# Patient Record
Sex: FEMALE | Race: WHITE | Employment: FULL TIME | URBAN - METROPOLITAN AREA
[De-identification: names, ages, dates, MRNs, and addresses within clinical notes are randomized per-mention and may not be internally consistent; named-entity substitution may affect disease eponyms.]

---

## 2017-08-14 RX ORDER — DROSPIRENONE AND ETHINYL ESTRADIOL TABLETS 0.02-3(28)
KIT ORAL
Qty: 84 TAB | Refills: 4 | Status: SHIPPED | OUTPATIENT
Start: 2017-08-14 | End: 2017-08-29

## 2017-08-29 ENCOUNTER — OFFICE VISIT (OUTPATIENT)
Dept: OBGYN CLINIC | Age: 42
End: 2017-08-29

## 2017-08-29 VITALS
DIASTOLIC BLOOD PRESSURE: 76 MMHG | WEIGHT: 158 LBS | BODY MASS INDEX: 26.98 KG/M2 | HEIGHT: 64 IN | SYSTOLIC BLOOD PRESSURE: 132 MMHG

## 2017-08-29 DIAGNOSIS — Z01.419 ENCOUNTER FOR GYNECOLOGICAL EXAMINATION WITHOUT ABNORMAL FINDING: Primary | ICD-10-CM

## 2017-08-29 NOTE — MR AVS SNAPSHOT
Visit Information Date & Time Provider Department Dept. Phone Encounter #  
 8/29/2017  1:20 PM Carson Lagunas MD [facility] [phone] [id number] Upcoming Health Maintenance Date Due INFLUENZA AGE 9 TO ADULT 8/1/2017 PAP AKA CERVICAL CYTOLOGY 4/24/2018 Allergies as of 8/29/2017  Review Complete On: 8/29/2017 By: Esther Holcomb No Known Allergies Current Immunizations  Never Reviewed Name Date Tdap 11/6/2013  6:28 PM  
  
 Not reviewed this visit Vitals BP Height(growth percentile) Weight(growth percentile) Breastfeeding? BMI OB Status 132/76 5' 4\" (1.626 m) 158 lb (71.7 kg) No 27.12 kg/m2 Having regular periods Smoking Status Never Smoker BMI and BSA Data Body Mass Index Body Surface Area  
 27.12 kg/m 2 1.8 m 2 Preferred Pharmacy Pharmacy Name Phone [id number], [address] [place] [address] Your Updated Medication List  
  
   
This list is accurate as of: 8/29/17  1:28 PM.  Always use your most recent med list.  
  
  
  
  
 Lavon Yan (26) 3-0.02 mg Tab Generic drug:  drospirenone-ethinyl estradiol TAKE 1 TAB BY MOUTH DAILY. oxyCODONE-acetaminophen 5-325 mg per tablet Commonly known as:  PERCOCET Take 1 Tab by mouth every four (4) hours as needed. Introducing [facility]! Dear Ravin Doll: 
Thank you for requesting a [facility] account. Our records indicate that you already have an active [facility] account. You can access your account anytime at [email or web address] Did you know that you can access your hospital and ER discharge instructions at any time in [facility]? You can also review all of your test results from your hospital stay or ER visit. Additional Information If you have questions, please visit the Frequently Asked Questions section of the [facility] website at [email or web address] Remember, MyChart is NOT to be used for urgent needs. For medical emergencies, dial 911. Now available from your iPhone and Android! Please provide this summary of care documentation to your next provider. Your primary care clinician is listed as Jenn Arevalo Ii. If you have any questions after today's visit, please call [phone].

## 2017-08-29 NOTE — PROGRESS NOTES
Annual exam ages 40-58    Tiffany Mendez is a ,  35 y.o. female [facility] No LMP recorded. .    She presents for her annual checkup. She is having no significant problems. She has headaches with her menses. With regard to the Gardasil vaccine, she is older than the age for which it is FDA approved. Menstrual status:    Her periods are moderate in flow. She is using three to five pads or tampons per day, usually regular and last 26-30 days. She denies dysmenorrhea. She reports no premenstrual symptoms. Contraception:    The current method of family planning is OCP (estrogen/progesterone). Sexual history:    She  reports that she currently engages in sexual activity and has had male partners. She reports using the following method of birth control/protection: Pill. Medical conditions:    Since her last annual GYN exam about one year ago, she has not the following changes in her health history: none. Pap and Mammogram History:    Her most recent Pap smear was normal, obtained 2 year(s) ago. The patient had her mammogram today in our office. Breast Cancer History/Substance Abuse: negative    Osteoporosis History:    Family history does not include a first or second degree relative with osteopenia or osteoporosis. Past Medical History:   Diagnosis Date    Abnormal Pap smear     BRCA negative     Endometriosis     Fibroids     HSV-2 (herpes simplex virus 2) infection     Pap smear for cervical cancer screening 8/17/10 neg HPV NEG 12 neg 4/24/15 neg HPV NEG     Past Surgical History:   Procedure Laterality Date    HX ADENOIDECTOMY      HX DILATION AND CURETTAGE      HX MYOMECTOMY      HX PELVIC LAPAROSCOPY      HX TONSILLECTOMY         Current Outpatient Prescriptions   Medication Sig Dispense Refill    LORYNA, 26, 3-0.02 mg tab TAKE 1 TAB BY MOUTH DAILY.  84 Tab 4    oxyCODONE-acetaminophen (PERCOCET) 5-325 mg per tablet Take 1 Tab by mouth every four (4) hours as needed. 30 Tab 0     Allergies: Review of patient's allergies indicates no known allergies. Tobacco History:  reports that she has never smoked. She has never used smokeless tobacco.  Alcohol Abuse:  reports that she does not drink alcohol. Drug Abuse:  reports that she does not use illicit drugs.     Family Medical/Cancer History:   Family History   Problem Relation Age of Onset    Breast Cancer Mother         Review of Systems - History obtained from the patient  Constitutional: negative for weight loss, fever, night sweats  HEENT: negative for hearing loss, earache, congestion, snoring, sorethroat  CV: negative for chest pain, palpitations, edema  Resp: negative for cough, shortness of breath, wheezing  GI: negative for change in bowel habits, abdominal pain, black or bloody stools  : negative for frequency, dysuria, hematuria, vaginal discharge  MSK: negative for back pain, joint pain, muscle pain  Breast: negative for breast lumps, nipple discharge, galactorrhea  Skin :negative for itching, rash, hives  Neuro: negative for dizziness, headache, confusion, weakness  Psych: negative for anxiety, depression, change in mood  Heme/lymph: negative for bleeding, bruising, pallor    Physical Exam    Visit Vitals    /76    Ht 5' 4\" (1.626 m)    Wt 158 lb (71.7 kg)    Breastfeeding No    BMI 27.12 kg/m2       Constitutional  · Appearance: well-nourished, well developed, alert, in no acute distress    HENT  · Head and Face: appears normal    Neck  · Inspection/Palpation: normal appearance, no masses or tenderness  · Lymph Nodes: no lymphadenopathy present  · Thyroid: gland size normal, nontender, no nodules or masses present on palpation    Chest  · Respiratory Effort: breathing unlabored    Breasts  · Inspection of Breasts: breasts symmetrical, no skin changes, no discharge present, nipple appearance normal, no skin retraction present  · Palpation of Breasts and Axillae: no masses present on palpation, no breast tenderness  · Axillary Lymph Nodes: no lymphadenopathy present    Gastrointestinal  · Abdominal Examination: abdomen non-tender to palpation, normal bowel sounds, no masses present  · Liver and spleen: no hepatomegaly present, spleen not palpable  · Hernias: no hernias identified    Genitourinary  · External Genitalia: normal appearance for age, no discharge present, no tenderness present, no inflammatory lesions present, no masses present, no atrophy present  · Vagina: normal vaginal vault without central or paravaginal defects, no discharge present, no inflammatory lesions present, no masses present  · Bladder: non-tender to palpation  · Urethra: appears normal  · Cervix: normal   · Uterus: normal size, shape and consistency  · Adnexa: no adnexal tenderness present, no adnexal masses present  · Perineum: perineum within normal limits, no evidence of trauma, no rashes or skin lesions present  · Anus: anus within normal limits, no hemorrhoids present  · Inguinal Lymph Nodes: no lymphadenopathy present    Skin  · General Inspection: no rash, no lesions identified    Neurologic/Psychiatric  · Mental Status:  · Orientation: grossly oriented to person, place and time  · Mood and Affect: mood normal, affect appropriate    Assessment:  Routine gynecologic examination  Her current medical status is satisfactory with no evidence of significant gynecologic issues. Menstrual migraines and hypoactive desire disorder on current ocp will switch to loloestrin.     Plan:  Counseled re: diet, exercise, healthy lifestyle  Return for yearly wellness visits  Rec annual mammogram

## 2017-11-27 ENCOUNTER — TELEPHONE (OUTPATIENT)
Dept: OBGYN CLINIC | Age: 42
End: 2017-11-27

## 2017-11-27 RX ORDER — DROSPIRENONE AND ETHINYL ESTRADIOL 0.03MG-3MG
1 KIT ORAL DAILY
Qty: 3 PACKAGE | Refills: 2 | Status: SHIPPED | OUTPATIENT
Start: 2017-11-27 | End: 2019-01-11

## 2017-11-27 NOTE — TELEPHONE ENCOUNTER
Call received at 1:35PM      38year old patient last seen in the office on 8/29/17. Patient calling to say that she likes the Lo Loestrin Fe but has changed insurance and it is to costly for her. Patient would like something else similar to Lulú. Please advised regarding prescription details.

## 2017-11-27 NOTE — TELEPHONE ENCOUNTER
Patient advised of MD recommendations and prescription sent as per MD order to patient preferred pharmacy till AE 8/2018. Patient verbalized understanding.

## 2017-11-29 RX ORDER — DROSPIRENONE AND ETHINYL ESTRADIOL 0.03MG-3MG
1 KIT ORAL DAILY
Qty: 3 PACKAGE | Refills: 2 | Status: SHIPPED | OUTPATIENT
Start: 2017-11-29 | End: 2019-01-11

## 2017-11-29 NOTE — TELEPHONE ENCOUNTER
Call received at 12:09PM    FW Patient calling back to say that the prescription sent on Monday for the generic Josefina Jodie is very expensive and the pharmacy as advised that a prescription sent for the brand name Lulú would only be a $10.00 co pay      ?  Ok to sent prescription for brand name only dispense as written    Please advise

## 2018-11-14 ENCOUNTER — TELEPHONE (OUTPATIENT)
Dept: OBGYN CLINIC | Age: 43
End: 2018-11-14

## 2018-11-14 NOTE — TELEPHONE ENCOUNTER
Call received at 152PM    37year old patient last seen in the office on 8/29/18. Patient has just scheduled her next appointment is for 12/27/18 for mammogram and annual.      Patient asking about getting a refill on her ocp  to get her to her scheduled appointment. Prescription sent for ocp as per MD order to patient preferred pharmacy to get her to her scheduled appointment. Patient advised of need to keep appointment in order to get further refills to get her to her appointment. Patient verbalized understanding.

## 2018-12-27 ENCOUNTER — APPOINTMENT (OUTPATIENT)
Dept: OBGYN CLINIC | Age: 43
End: 2018-12-27

## 2019-01-05 RX ORDER — NORETHINDRONE ACETATE AND ETHINYL ESTRADIOL, ETHINYL ESTRADIOL AND FERROUS FUMARATE 1MG-10(24)
KIT ORAL
Qty: 3 PACKAGE | Refills: 3 | Status: SHIPPED | OUTPATIENT
Start: 2019-01-05 | End: 2019-01-11 | Stop reason: SDUPTHER

## 2019-01-11 ENCOUNTER — OFFICE VISIT (OUTPATIENT)
Dept: OBGYN CLINIC | Age: 44
End: 2019-01-11

## 2019-01-11 VITALS
DIASTOLIC BLOOD PRESSURE: 74 MMHG | SYSTOLIC BLOOD PRESSURE: 118 MMHG | WEIGHT: 168 LBS | HEIGHT: 64 IN | BODY MASS INDEX: 28.68 KG/M2

## 2019-01-11 DIAGNOSIS — Z01.419 ENCOUNTER FOR GYNECOLOGICAL EXAMINATION WITHOUT ABNORMAL FINDING: Primary | ICD-10-CM

## 2019-01-11 RX ORDER — SUMATRIPTAN 25 MG/1
25 TABLET, FILM COATED ORAL
COMMUNITY
End: 2020-02-13

## 2019-01-11 NOTE — PROGRESS NOTES
Annual exam ages 40-58    Patric Mohr is a ,  37 y.o. female [facility] No LMP recorded. .    She presents for her annual checkup. She is having significant migraines and hot flashes. With regard to the Gardasil vaccine, she is older than the age for which it is FDA approved. Menstrual status:    Her periods are moderate in flow. She is using three to five pads or tampons per day, usually regular and last 26-30 days. She denies dysmenorrhea. She reports no premenstrual symptoms. Contraception:    The current method of family planning is OCP (estrogen/progesterone). Sexual history:    She  reports that she currently engages in sexual activity and has had partners who are Male. She reports using the following method of birth control/protection: Pill. Medical conditions:    Since her last annual GYN exam about one year ago, she has not the following changes in her health history: none. Pap and Mammogram History:    Her most recent Pap smear was normal, obtained 4 year(s) ago. The patient had a recent mammogram 2018 which was negative for malignancy. Breast Cancer History/Substance Abuse: maternal family hx    Osteoporosis History:    Family history does not include a first or second degree relative with osteopenia or osteoporosis. Past Medical History:   Diagnosis Date    Abnormal Pap smear     BRCA negative     Endometriosis     Fibroids     HSV-2 (herpes simplex virus 2) infection     Pap smear for cervical cancer screening 8/17/10 neg HPV NEG 12 neg 4/24/15 neg HPV NEG     Past Surgical History:   Procedure Laterality Date    HX ADENOIDECTOMY      HX DILATION AND CURETTAGE      HX MYOMECTOMY      HX PELVIC LAPAROSCOPY      HX TONSILLECTOMY         Current Outpatient Medications   Medication Sig Dispense Refill    SUMAtriptan (IMITREX) 25 mg tablet Take 25 mg by mouth once as needed for Migraine.       LO LOESTRIN FE 1 mg-10 mcg (24)/10 mcg (2) tab TAKE 1 TABLET BY MOUTH EVERY DAY 3 Package 3    drospirenone-ethinyl estradiol (AZ) 3-0.03 mg tab Take 1 Tab by mouth daily. 3 Package 2    drospirenone-ethinyl estradiol (OCELLA) 3-0.03 mg tab Take 1 Tab by mouth daily. 3 Package 2    oxyCODONE-acetaminophen (PERCOCET) 5-325 mg per tablet Take 1 Tab by mouth every four (4) hours as needed. 30 Tab 0     Allergies: Patient has no known allergies. Tobacco History:  reports that  has never smoked. she has never used smokeless tobacco.  Alcohol Abuse:  reports that she does not drink alcohol. Drug Abuse:  reports that she does not use drugs.     Family Medical/Cancer History:   Family History   Problem Relation Age of Onset    Breast Cancer Mother         Review of Systems - History obtained from the patient  Constitutional: negative for weight loss, fever, night sweats  HEENT: negative for hearing loss, earache, congestion, snoring, sorethroat  CV: negative for chest pain, palpitations, edema  Resp: negative for cough, shortness of breath, wheezing  GI: negative for change in bowel habits, abdominal pain, black or bloody stools  : negative for frequency, dysuria, hematuria, vaginal discharge  MSK: negative for back pain, joint pain, muscle pain  Breast: negative for breast lumps, nipple discharge, galactorrhea  Skin :negative for itching, rash, hives  Neuro: negative for dizziness, headache, confusion, weakness  Psych: negative for anxiety, depression, change in mood  Heme/lymph: negative for bleeding, bruising, pallor    Physical Exam    Visit Vitals  /74   Ht 5' 4\" (1.626 m)   Wt 168 lb (76.2 kg)   BMI 28.84 kg/m²       Constitutional  · Appearance: well-nourished, well developed, alert, in no acute distress    HENT  · Head and Face: appears normal    Neck  · Inspection/Palpation: normal appearance, no masses or tenderness  · Lymph Nodes: no lymphadenopathy present  · Thyroid: gland size normal, nontender, no nodules or masses present on palpation    Chest  · Respiratory Effort: breathing unlabored    Breasts  · Inspection of Breasts: breasts symmetrical, no skin changes, no discharge present, nipple appearance normal, no skin retraction present  · Palpation of Breasts and Axillae: no masses present on palpation, no breast tenderness  · Axillary Lymph Nodes: no lymphadenopathy present    Gastrointestinal  · Abdominal Examination: abdomen non-tender to palpation, normal bowel sounds, no masses present  · Liver and spleen: no hepatomegaly present, spleen not palpable  · Hernias: no hernias identified    Genitourinary  · External Genitalia: normal appearance for age, no discharge present, no tenderness present, no inflammatory lesions present, no masses present, no atrophy present  · Vagina: normal vaginal vault without central or paravaginal defects, no discharge present, no inflammatory lesions present, no masses present  · Bladder: non-tender to palpation  · Urethra: appears normal  · Cervix: normal   · Uterus: normal size, shape and consistency  · Adnexa: no adnexal tenderness present, no adnexal masses present  · Perineum: perineum within normal limits, no evidence of trauma, no rashes or skin lesions present  · Anus: anus within normal limits, no hemorrhoids present  · Inguinal Lymph Nodes: no lymphadenopathy present    Skin  · General Inspection: no rash, no lesions identified    Neurologic/Psychiatric  · Mental Status:  · Orientation: grossly oriented to person, place and time  · Mood and Affect: mood normal, affect appropriate    Assessment:  Routine gynecologic examination  Her current medical status is satisfactory with no evidence of significant gynecologic issues.   Menstrual migraines-  Will start continuous ocps      Plan:  Counseled re: diet, exercise, healthy lifestyle  Return for yearly wellness visits  Rec annual mammogram

## 2019-03-15 ENCOUNTER — TELEPHONE (OUTPATIENT)
Dept: OBGYN CLINIC | Age: 44
End: 2019-03-15

## 2019-03-15 NOTE — TELEPHONE ENCOUNTER
Patient states that she has been having heavy AUB on Lo Loestrin (she is taking the pills Active pills only). She feels that she has been on them well over 3 months and is still very abnormal in her bleeding pattern. She feels sometimes she has bleeding that is spotting and then other times she has to run to insert a tampon. Not soaking through a tampon but dirties it. Lower back pain  Has a history of endometriosis and fibroids in uterine wall. Patient states that she is mostly using this medication to help control migraines the week of her cycle, which has done well. Consult with FW set up to come in to talk about next option. She will try Ibuprofen 600-800 mg q8hrs x 72 hours to help with inflammation and bleeding (take with food). Call prn.

## 2019-03-25 ENCOUNTER — OFFICE VISIT (OUTPATIENT)
Dept: OBGYN CLINIC | Age: 44
End: 2019-03-25

## 2019-03-25 DIAGNOSIS — N93.9 ABNORMAL UTERINE BLEEDING (AUB): Primary | ICD-10-CM

## 2019-03-25 LAB
HCG URINE, QL. (POC): NEGATIVE
VALID INTERNAL CONTROL?: YES

## 2019-03-25 NOTE — PROGRESS NOTES
Abnormal bleeding note      Alberto Darnellman is a 37 y.o. female who complains of vaginal bleeding problems. Her current method of family planning is OCP (estrogen/progesterone). She developed this problem approximately a few weeks ago. She has had vaginal bleeding which she describes as moderate, spotting, irregular, with cramping lasting up to a few weeks. She switched to continuous OCP's recently. Associated symptoms include cramping. Alleviating factors: none    Aggravating factors: none      The patient is sexually active. Her relevant past medical history:   Past Medical History:   Diagnosis Date    Abnormal Pap smear 2001    BRCA negative     Endometriosis     Fibroids     HSV-2 (herpes simplex virus 2) infection     Pap smear for cervical cancer screening 8/17/10 neg HPV NEG 9/24/12 neg 4/24/15 neg HPV NEG        Past Surgical History:   Procedure Laterality Date    HX ADENOIDECTOMY      HX DILATION AND CURETTAGE      HX MYOMECTOMY      HX PELVIC LAPAROSCOPY      HX TONSILLECTOMY       Social History     Occupational History    Not on file   Tobacco Use    Smoking status: Never Smoker    Smokeless tobacco: Never Used   Substance and Sexual Activity    Alcohol use: No    Drug use: No    Sexual activity: Yes     Partners: Male     Birth control/protection: Pill     Family History   Problem Relation Age of Onset    Breast Cancer Mother        No Known Allergies  Prior to Admission medications    Medication Sig Start Date End Date Taking? Authorizing Provider   SUMAtriptan (IMITREX) 25 mg tablet Take 25 mg by mouth once as needed for Migraine. Yes Provider, Historical   norethindrone-e.estradiol-iron (LO LOESTRIN FE) 1 mg-10 mcg (24)/10 mcg (2) tab TAKE 1 TABLET BY MOUTH EVERY DAY, SKIP PLACEBOS TO ACHIEVE AMENORRHEA 1/11/19  Yes Tamika Alvarez MD   oxyCODONE-acetaminophen (PERCOCET) 5-325 mg per tablet Take 1 Tab by mouth every four (4) hours as needed.  11/6/13 Kia Iqbal MD        Review of Systems - History obtained from the patient  Constitutional: negative for weight loss, fever, night sweats  HEENT: negative for hearing loss, earache, congestion, snoring, sorethroat  CV: negative for chest pain, palpitations, edema  Resp: negative for cough, shortness of breath, wheezing  Breast: negative for breast lumps, nipple discharge, galactorrhea  GI: negative for change in bowel habits, abdominal pain, black or bloody stools  : negative for frequency, dysuria, hematuria  MSK: negative for back pain, joint pain, muscle pain  Skin: negative for itching, rash, hives  Neuro: negative for dizziness, headache, confusion, weakness  Psych: negative for anxiety, depression, change in mood  Heme/lymph: negative for bleeding, bruising, pallor      Objective: There were no vitals taken for this visit.        PHYSICAL EXAMINATION    Constitutional  · Appearance: well-nourished, well developed, alert, in no acute distress    HENT  · Head and Face: appears normal    Neck  · Inspection/Palpation: normal appearance, no masses or tenderness  · Lymph Nodes: no lymphadenopathy present  · Thyroid: gland size normal, nontender, no nodules or masses present on palpation  ·   Breasts  · Inspection of Breasts: breasts symmetrical, no skin changes, no discharge present, nipple appearance normal, no skin retraction present  · Palpation of Breasts and Axillae: no masses present on palpation, no breast tenderness  · Axillary Lymph Nodes: no lymphadenopathy present    Gastrointestinal  · Abdominal Examination: abdomen non-tender to palpation, normal bowel sounds, no masses present  · Liver and spleen: no hepatomegaly present, spleen not palpable  · Hernias: no hernias identified    Genitourinary  · External Genitalia: normal appearance for age, no discharge present, no tenderness present, no inflammatory lesions present, no masses present, no atrophy present  · Vagina: normal vaginal vault without central or paravaginal defects, no discharge present, no inflammatory lesions present, no masses present  · Bladder: non-tender to palpation  · Urethra: appears normal  · Cervix: normal   · Uterus: normal size, shape and consistency  · Adnexa: no adnexal tenderness present, no adnexal masses present  · Perineum: perineum within normal limits, no evidence of trauma, no rashes or skin lesions present  · Anus: anus within normal limits, no hemorrhoids present  · Inguinal Lymph Nodes: no lymphadenopathy present    Skin  · General Inspection: no rash, no lesions identified    Neurologic/Psychiatric  · Mental Status:  · Orientation: grossly oriented to person, place and time  · Mood and Affect: mood normal, affect appropriate    Assessment/Plan:   AUB- likely from continuous ocps. Will go back to taking normal x 1-2 packs then can restart continuous (for menstrual migraine prevention). If no improvement consider switching to different ocp vs ablation + ocps for menstrual migraines. Instructions given to pt. Handouts given to pt.

## 2019-03-27 LAB
FT4I SERPL CALC-MCNC: 1.8 (ref 1.2–4.9)
T3RU NFR SERPL: 21 % (ref 24–39)
T4 SERPL-MCNC: 8.8 UG/DL (ref 4.5–12)
TSH SERPL DL<=0.005 MIU/L-ACNC: 2.92 UIU/ML (ref 0.45–4.5)

## 2020-02-13 ENCOUNTER — OFFICE VISIT (OUTPATIENT)
Dept: OBGYN CLINIC | Age: 45
End: 2020-02-13

## 2020-02-13 VITALS
BODY MASS INDEX: 29.88 KG/M2 | SYSTOLIC BLOOD PRESSURE: 112 MMHG | HEIGHT: 64 IN | WEIGHT: 175 LBS | DIASTOLIC BLOOD PRESSURE: 76 MMHG

## 2020-02-13 DIAGNOSIS — Z01.419 ENCOUNTER FOR GYNECOLOGICAL EXAMINATION WITHOUT ABNORMAL FINDING: Primary | ICD-10-CM

## 2020-02-13 NOTE — PROGRESS NOTES
Annual exam ages 40-58      Joe Holden is a ,  53 y.o. female   No LMP recorded. She presents for her annual checkup. She is having no significant problems. Menstrual status:    Her periods are light in flow. She is using one to three pads or tampons per day, usually regular with a 26-32 day interval with 3-7 day duration. She does not have dysmenorrhea. She reports no premenstrual symptoms. Contraception:    The current method of family planning is OCP. Hormonal status:  She reports no perimenstrual type symptoms. She is not having vasomotor symptoms. The patient is not using any ERT. Sexual history:    She  reports being sexually active and has had partner(s) who are Male. She reports using the following method of birth control/protection: Pill. Medical conditions:    Since her last annual GYN exam about one year ago, she has not the following changes in her health history: none. Surgical history confirmed with patient. has a past surgical history that includes hx adenoidectomy; hx dilation and curettage; hx pelvic laparoscopy; hx myomectomy; and hx tonsillectomy. Pap and Mammogram History:    Her most recent Pap smear was normal, obtained 5 year(s) ago. The patient had her mammogram today in our office. Breast Cancer History/Substance Abuse: +mother      Osteoporosis History:    Family history does not include a first or second degree relative with osteopenia or osteoporosis.       Past Medical History:   Diagnosis Date    Abnormal Pap smear     BRCA negative     Endometriosis     Fibroids     HSV-2 (herpes simplex virus 2) infection     Pap smear for cervical cancer screening 8/17/10 neg HPV NEG 12 neg 4/24/15 neg HPV NEG    SV[facility]supra[facility]ricular [facility] ([facility])      Past Surgical History:   Procedure Laterality Date    HX ADENOIDECTOMY      HX DILATION AND CURETTAGE      HX MYOMECTOMY      HX PELVIC LAPAROSCOPY      HX TONSILLECTOMY         Current Outpatient Medications   Medication Sig Dispense Refill    METOPROLOL SUCCINATE PO Take  by mouth.  norethindrone-e.estradiol-iron (LO LOESTRIN FE) 1 mg-10 mcg (24)/10 mcg (2) tab TAKE 1 TABLET BY MOUTH EVERY DAY, SKIP PLACEBOS TO ACHIEVE AMENORRHEA 3 Package 3    SUMAtriptan (IMITREX) 25 mg tablet Take 25 mg by mouth once as needed for Migraine.  oxyCODONE-acetaminophen (PERCOCET) 5-325 mg per tablet Take 1 Tab by mouth every four (4) hours as needed. 30 Tab 0     Allergies: Patient has no known allergies. Tobacco History:  reports that she has never smoked. She has never used smokeless tobacco.  Alcohol Abuse:  reports no history of alcohol use. Drug Abuse:  reports no history of drug use.     Family Medical/Cancer History:   Family History   Problem Relation Age of Onset    Breast Cancer Mother         Review of Systems - History obtained from the patient  Constitutional: negative for weight loss, fever, night sweats  HEENT: negative for hearing loss, earache, congestion, snoring, sorethroat  CV: negative for chest pain, palpitations, edema  Resp: negative for cough, shortness of breath, wheezing  GI: negative for change in bowel habits, abdominal pain, black or bloody stools  : negative for frequency, dysuria, hematuria, vaginal discharge  MSK: negative for back pain, joint pain, muscle pain  Breast: negative for breast lumps, nipple discharge, galactorrhea  Skin :negative for itching, rash, hives  Neuro: negative for dizziness, headache, confusion, weakness  Psych: negative for anxiety, depression, change in mood  Heme/lymph: negative for bleeding, bruising, pallor    Physical Exam    Visit Vitals  /76   Ht 5' 4\" (1.626 m)   Wt 175 lb (79.4 kg)   BMI 30.04 kg/m²       Constitutional  · Appearance: well-nourished, well developed, alert, in no acute distress    HENT  · Head and Face: appears normal    Neck  · Inspection/Palpation: normal appearance, no masses or tenderness  · Lymph Nodes: no lymphadenopathy present  · Thyroid: gland size normal, nontender, no nodules or masses present on palpation    Chest  · Respiratory Effort: breathing unlabored    Breasts  · Inspection of Breasts: breasts symmetrical, no skin changes, no discharge present, nipple appearance normal, no skin retraction present  · Palpation of Breasts and Axillae: no masses present on palpation, no breast tenderness  · Axillary Lymph Nodes: no lymphadenopathy present    Gastrointestinal  · Abdominal Examination: abdomen non-tender to palpation, normal bowel sounds, no masses present  · Liver and spleen: no hepatomegaly present, spleen not palpable  · Hernias: no hernias identified    Genitourinary  · External Genitalia: normal appearance for age, no discharge present, no tenderness present, no inflammatory lesions present, no masses present, no atrophy present  · Vagina: normal vaginal vault without central or paravaginal defects, no discharge present, no inflammatory lesions present, no masses present  · Bladder: non-tender to palpation  · Urethra: appears normal  · Cervix: normal   · Uterus: normal size, shape and consistency  · Adnexa: no adnexal tenderness present, no adnexal masses present  · Perineum: perineum within normal limits, no evidence of trauma, no rashes or skin lesions present  · Anus: anus within normal limits, no hemorrhoids present  · Inguinal Lymph Nodes: no lymphadenopathy present    Skin  · General Inspection: no rash, no lesions identified    Neurologic/Psychiatric  · Mental Status:  · Orientation: grossly oriented to person, place and time  · Mood and Affect: mood normal, affect appropriate    Assessment:  Routine gynecologic examination  Her current medical status is satisfactory with no evidence of significant gynecologic issues.   Continue ocps- pt denied migraines with [facility]    Plan:  Counseled re: diet, exercise, healthy lifestyle  Return for yearly wellness visits  Rec annual mammogram

## 2020-02-18 LAB
CYTOLOGIST CVX/VAG CYTO: NORMAL
CYTOLOGY CVX/VAG DOC CYTO: NORMAL
CYTOLOGY CVX/VAG DOC THIN PREP: NORMAL
CYTOLOGY HISTORY:: NORMAL
DX ICD CODE: NORMAL
HPV I/H RISK 1 DNA CVX QL PROBE+SIG AMP: NEGATIVE
Lab: NORMAL
OTHER STN SPEC: NORMAL
STAT OF ADQ CVX/VAG CYTO-IMP: NORMAL

## 2021-01-08 RX ORDER — NORETHINDRONE ACETATE AND ETHINYL ESTRADIOL, ETHINYL ESTRADIOL AND FERROUS FUMARATE 1MG-10(24)
KIT ORAL
Refills: 3 | OUTPATIENT
Start: 2021-01-08

## 2021-01-08 NOTE — TELEPHONE ENCOUNTER
Last AE 2/13/20    Next AE not scheduled, will decline refill of the Lo Loestrin by pharmacy request until set.

## 2021-03-01 ENCOUNTER — OFFICE VISIT (OUTPATIENT)
Dept: OBGYN CLINIC | Age: 46
End: 2021-03-01

## 2021-03-01 VITALS — DIASTOLIC BLOOD PRESSURE: 76 MMHG | SYSTOLIC BLOOD PRESSURE: 126 MMHG | WEIGHT: 166 LBS | BODY MASS INDEX: 28.49 KG/M2

## 2021-03-01 DIAGNOSIS — Z01.419 ENCOUNTER FOR GYNECOLOGICAL EXAMINATION WITHOUT ABNORMAL FINDING: Primary | ICD-10-CM

## 2021-03-01 PROCEDURE — 99396 PREV VISIT EST AGE 40-64: CPT | Performed by: OBSTETRICS & GYNECOLOGY

## 2021-03-01 RX ORDER — NORETHINDRONE ACETATE AND ETHINYL ESTRADIOL, ETHINYL ESTRADIOL AND FERROUS FUMARATE 1MG-10(24)
KIT ORAL
Qty: 3 PACKAGE | Refills: 4 | Status: SHIPPED | OUTPATIENT
Start: 2021-03-01 | End: 2022-04-26 | Stop reason: SDUPTHER

## 2021-03-01 NOTE — PROGRESS NOTES
Annual exam ages 40-58      Yasmany Meza is a ,  50 y.o. female   No LMP recorded. (Menstrual status: Chemically Induced). She presents for her annual checkup. She is having no significant problems. Menstrual status:    Her periods are absent due to OCP's. Contraception:    The current method of family planning is OCP. Hormonal status:  She reports no perimenstrual type symptoms. She is not having vasomotor symptoms. The patient is not using any ERT. Sexual history:    She  reports being sexually active and has had partner(s) who are Male. She reports using the following method of birth control/protection: Pill. Medical conditions:    Since her last annual GYN exam about one year ago, she has not the following changes in her health history: none. Surgical history confirmed with patient. has a past surgical history that includes hx adenoidectomy; hx dilation and curettage; hx pelvic laparoscopy; hx myomectomy; and hx tonsillectomy. Pap and Mammogram History:    Her most recent Pap smear was normal, obtained 1 year(s) ago. The patient had her mammogram today in our office. Breast Cancer History/Substance Abuse: +mother      Osteoporosis History:    Family history does not include a first or second degree relative with osteopenia or osteoporosis.     Past Medical History:   Diagnosis Date    Abnormal Pap smear     BRCA negative     Endometriosis     Fibroids     HSV-2 (herpes simplex virus 2) infection     Pap smear for cervical cancer screening 8/17/10 neg HPV NEG 12 neg 4/24/15 neg HPV NEG    SVT (supraventricular tachycardia) ([facility])      Past Surgical History:   Procedure Laterality Date    HX ADENOIDECTOMY      HX DILATION AND CURETTAGE      HX MYOMECTOMY      HX PELVIC LAPAROSCOPY      HX TONSILLECTOMY         Current Outpatient Medications   Medication Sig Dispense Refill    norethindrone-e.estradioL-iron (LO LOESTRIN FE) 1 mg-10 mcg (24)/10 mcg (2) tab TAKE 1 TABLET BY MOUTH EVERY DAY, SKIP PLACEBOS TO ACHIEVE AMENORRHEA 3 Package 4    METOPROLOL SUCCINATE PO Take  by mouth.  oxyCODONE-acetaminophen (PERCOCET) 5-325 mg per tablet Take 1 Tab by mouth every four (4) hours as needed. 30 Tab 0     Allergies: Patient has no known allergies. Tobacco History:  reports that she has never smoked. She has never used smokeless tobacco.  Alcohol Abuse:  reports no history of alcohol use. Drug Abuse:  reports no history of drug use.     Family Medical/Cancer History:   Family History   Problem Relation Age of Onset    Breast Cancer Mother         Review of Systems - History obtained from the patient  Constitutional: negative for weight loss, fever, night sweats  HEENT: negative for hearing loss, earache, congestion, snoring, sorethroat  CV: negative for chest pain, palpitations, edema  Resp: negative for cough, shortness of breath, wheezing  GI: negative for change in bowel habits, abdominal pain, black or bloody stools  : negative for frequency, dysuria, hematuria, vaginal discharge  MSK: negative for back pain, joint pain, muscle pain  Breast: negative for breast lumps, nipple discharge, galactorrhea  Skin :negative for itching, rash, hives  Neuro: negative for dizziness, headache, confusion, weakness  Psych: negative for anxiety, depression, change in mood  Heme/lymph: negative for bleeding, bruising, pallor    Physical Exam    Visit Vitals  /76   Wt 166 lb (75.3 kg)   BMI 28.49 kg/m²       Constitutional  · Appearance: well-nourished, well developed, alert, in no acute distress    HENT  · Head and Face: appears normal    Neck  · Inspection/Palpation: normal appearance, no masses or tenderness  · Lymph Nodes: no lymphadenopathy present  · Thyroid: gland size normal, nontender, no nodules or masses present on palpation    Chest  · Respiratory Effort: breathing unlabored    Breasts  · Inspection of Breasts: breasts symmetrical, no skin changes, no discharge present, nipple appearance normal, no skin retraction present  · Palpation of Breasts and Axillae: no masses present on palpation, no breast tenderness  · Axillary Lymph Nodes: no lymphadenopathy present    Gastrointestinal  · Abdominal Examination: abdomen non-tender to palpation, normal bowel sounds, no masses present  · Liver and spleen: no hepatomegaly present, spleen not palpable  · Hernias: no hernias identified    Genitourinary  · External Genitalia: normal appearance for age, no discharge present, no tenderness present, no inflammatory lesions present, no masses present, no atrophy present  · Vagina: normal vaginal vault without central or paravaginal defects, no discharge present, no inflammatory lesions present, no masses present  · Bladder: non-tender to palpation  · Urethra: appears normal  · Cervix: normal   · Uterus: normal size, shape and consistency  · Adnexa: no adnexal tenderness present, no adnexal masses present  · Perineum: perineum within normal limits, no evidence of trauma, no rashes or skin lesions present  · Anus: anus within normal limits, no hemorrhoids present  · Inguinal Lymph Nodes: no lymphadenopathy present    Skin  · General Inspection: no rash, no lesions identified    Neurologic/Psychiatric  · Mental Status:  · Orientation: grossly oriented to person, place and time  · Mood and Affect: mood normal, affect appropriate    Assessment:  Routine gynecologic examination  Her current medical status is satisfactory with no evidence of significant gynecologic issues.     Plan:  Counseled re: diet, exercise, healthy lifestyle  Return for yearly wellness visits  Rec annual mammogram

## 2022-04-22 NOTE — PROGRESS NOTES
Annual exam ages 40-58      Alyson Sullivan is a ,  48 y.o. female   No LMP recorded. (Menstrual status: Chemically Induced). She presents for her annual checkup. She is having no significant problems. Menstrual status:    Her periods have been occurring monthly the last several months. Contraception:    The current method of family planning is OCP. Hormonal status:  She reports no perimenstrual type symptoms. She is not having vasomotor symptoms. The patient is not using any ERT. Sexual history:    She  reports being sexually active and has had partner(s) who are male. She reports using the following method of birth control/protection: Pill. Medical conditions:    Since her last annual GYN exam about one year ago, she has not the following changes in her health history: none. Surgical history confirmed with patient. has a past surgical history that includes hx adenoidectomy; hx dilation and curettage; hx pelvic laparoscopy; hx myomectomy; and hx tonsillectomy. Pap and Mammogram History:    Her most recent Pap smear was normal, obtained 2 year(s) ago. The patient is scheduled for a mammogram on 22. .    Breast Cancer History/Substance Abuse: +mother. Pt is BRCA negative. Osteoporosis History:    Family history does not include a first or second degree relative with osteopenia or osteoporosis.     Past Medical History:   Diagnosis Date    Abnormal Pap smear     BRCA negative     Endometriosis     Fibroids     HSV-2 (herpes simplex virus 2) infection     Pap smear for cervical cancer screening 8/17/10 neg HPV NEG 12 neg 4/24/15 neg HPV NEG    SVT (supraventricular tachycardia) ([facility])      Past Surgical History:   Procedure Laterality Date    HX ADENOIDECTOMY      HX DILATION AND CURETTAGE      HX MYOMECTOMY      HX PELVIC LAPAROSCOPY      HX TONSILLECTOMY         Current Outpatient Medications   Medication Sig Dispense Refill    norethindrone-e.estradioL-iron (Lo Loestrin Fe) 1 mg-10 mcg (24)/10 mcg (2) tab TAKE 1 TABLET BY MOUTH EVERY DAY, SKIP PLACEBOS TO ACHIEVE AMENORRHEA 3 Package 4    METOPROLOL SUCCINATE PO Take  by mouth.  oxyCODONE-acetaminophen (PERCOCET) 5-325 mg per tablet Take 1 Tab by mouth every four (4) hours as needed. 30 Tab 0     Allergies: Patient has no known allergies. Tobacco History:  reports that she has never smoked. She has never used smokeless tobacco.  Alcohol Abuse:  reports no history of alcohol use. Drug Abuse:  reports no history of drug use. Family Medical/Cancer History:   Family History   Problem Relation Age of Onset    Breast Cancer Mother         Review of Systems - History obtained from the patient  Constitutional: negative for weight loss, fever, night sweats  HEENT: negative for hearing loss, earache, congestion, snoring, sorethroat  CV: negative for chest pain, palpitations, edema  Resp: negative for cough, shortness of breath, wheezing  GI: negative for change in bowel habits, abdominal pain, black or bloody stools  : negative for frequency, dysuria, hematuria, vaginal discharge  MSK: negative for back pain, joint pain, muscle pain  Breast: negative for breast lumps, nipple discharge, galactorrhea  Skin :negative for itching, rash, hives  Neuro: negative for dizziness, headache, confusion, weakness  Psych: negative for anxiety, depression, change in mood  Heme/lymph: negative for bleeding, bruising, pallor    Physical Exam    There were no vitals taken for this visit.     Constitutional  · Appearance: well-nourished, well developed, alert, in no acute distress    HENT  · Head and Face: appears normal    Neck  · Inspection/Palpation: normal appearance, no masses or tenderness  · Lymph Nodes: no lymphadenopathy present  · Thyroid: gland size normal, nontender, no nodules or masses present on palpation    Chest  · Respiratory Effort: breathing unlabored    Breasts  · Inspection of Breasts: breasts symmetrical, no skin changes, no discharge present, nipple appearance normal, no skin retraction present  · Palpation of Breasts and Axillae: no masses present on palpation, no breast tenderness  · Axillary Lymph Nodes: no lymphadenopathy present    Gastrointestinal  · Abdominal Examination: abdomen non-tender to palpation, normal bowel sounds, no masses present  · Liver and spleen: no hepatomegaly present, spleen not palpable  · Hernias: no hernias identified    Genitourinary  · External Genitalia: normal appearance for age, no discharge present, no tenderness present, no inflammatory lesions present, no masses present, no atrophy present  · Vagina: normal vaginal vault without central or paravaginal defects, no discharge present, no inflammatory lesions present, no masses present  · Bladder: non-tender to palpation  · Urethra: appears normal  · Cervix: normal   · Uterus: normal size, shape and consistency  · Adnexa: no adnexal tenderness present, no adnexal masses present  · Perineum: perineum within normal limits, no evidence of trauma, no rashes or skin lesions present  · Anus: anus within normal limits, no hemorrhoids present  · Inguinal Lymph Nodes: no lymphadenopathy present    Skin  · General Inspection: no rash, no lesions identified    Neurologic/Psychiatric  · Mental Status:  · Orientation: grossly oriented to person, place and time  · Mood and Affect: mood normal, affect appropriate    Assessment:  Routine gynecologic examination  Her current medical status is satisfactory with no evidence of significant gynecologic issues. Mother with Breast cancer at 52 and Maternal Grandmother with ovarian cancer. Pt believes she previously had BRCA gene testing by not results of testing could be found, rec [employer] testing.   AUB/cramping- rto for ultrasound    Plan:  Counseled re: diet, exercise, healthy lifestyle  Return for yearly wellness visits  Rec annual mammogram

## 2022-04-25 ENCOUNTER — PATIENT MESSAGE (OUTPATIENT)
Dept: OBGYN CLINIC | Age: 47
End: 2022-04-25

## 2022-04-25 ENCOUNTER — OFFICE VISIT (OUTPATIENT)
Dept: OBGYN CLINIC | Age: 47
End: 2022-04-25
Payer: COMMERCIAL

## 2022-04-25 VITALS
BODY MASS INDEX: 28.85 KG/M2 | RESPIRATION RATE: 80 BRPM | SYSTOLIC BLOOD PRESSURE: 132 MMHG | WEIGHT: 169 LBS | DIASTOLIC BLOOD PRESSURE: 72 MMHG | HEIGHT: 64 IN

## 2022-04-25 DIAGNOSIS — Z80.9 FAMILY HISTORY OF CANCER: Primary | ICD-10-CM

## 2022-04-25 PROCEDURE — 99396 PREV VISIT EST AGE 40-64: CPT | Performed by: OBSTETRICS & GYNECOLOGY

## 2022-04-26 RX ORDER — NORETHINDRONE ACETATE AND ETHINYL ESTRADIOL, ETHINYL ESTRADIOL AND FERROUS FUMARATE 1MG-10(24)
KIT ORAL
Qty: 3 DOSE PACK | Refills: 4 | Status: SHIPPED | OUTPATIENT
Start: 2022-04-26

## 2022-05-10 ENCOUNTER — TELEPHONE (OUTPATIENT)
Dept: OBGYN CLINIC | Age: 47
End: 2022-05-10

## 2022-05-10 NOTE — TELEPHONE ENCOUNTER
Called pt and left message to return call. 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 This is associated with increased risk of breast cancer (30% lifetime risk), ovarian cancer (<3% lifetime risk) and pancreatic cancer (5-10% lifetime risk). Will schedule appt with genetic counselor. If pt returns call please notify of these lab results. If she would like to speak the the junior counselor before her genetic counseling appt then please provide her with the phone number or web site address.

## 2022-05-10 NOTE — TELEPHONE ENCOUNTER
Conversation  [facility] [employer])    Tereso Montiel MD     FW    5/10/22 8:59 AM  Note     Called pt and left message to return call. 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 This is associated with increased risk of breast cancer (30% lifetime risk), ovarian cancer (<3% lifetime risk) and pancreatic cancer (5-10% lifetime risk).    Will schedule appt with genetic counselor. If pt returns call please notify of these lab results. If she would like to speak the the junior counselor before her genetic counseling appt then please provide her with the phone number or web site address. Patient was advised and transferred to Dr. Maddie Diallo     Patient was sent a my chart message with contact information    48year old patient last seen in the office on 4 25/2022     Patient verbalized understanding.

## 2022-06-06 ENCOUNTER — PATIENT MESSAGE (OUTPATIENT)
Dept: OBGYN CLINIC | Age: 47
End: 2022-06-06

## 2022-09-06 ENCOUNTER — OFFICE VISIT (OUTPATIENT)
Dept: OBGYN CLINIC | Age: 47
End: 2022-09-06

## 2022-09-06 DIAGNOSIS — R10.2 PELVIC PAIN IN FEMALE: ICD-10-CM

## 2022-09-06 DIAGNOSIS — N92.0 MENORRHAGIA WITH REGULAR CYCLE: Primary | ICD-10-CM

## 2022-09-06 DIAGNOSIS — Z14.8 GENETIC CARRIER: ICD-10-CM

## 2022-09-06 DIAGNOSIS — Z14.8 GENETIC CARRIER OF HERITABLE CANCER: ICD-10-CM

## 2022-09-06 DIAGNOSIS — D21.9 FIBROIDS: ICD-10-CM

## 2022-09-06 LAB
HCG URINE, QL. (POC): NEGATIVE
VALID INTERNAL CONTROL?: YES

## 2022-09-06 PROCEDURE — 81025 URINE PREGNANCY TEST: CPT | Performed by: OBSTETRICS & GYNECOLOGY

## 2022-09-06 PROCEDURE — 99214 OFFICE O/P EST MOD 30 MIN: CPT | Performed by: OBSTETRICS & GYNECOLOGY

## 2022-09-06 PROCEDURE — 58100 BIOPSY OF UTERUS LINING: CPT | Performed by: OBSTETRICS & GYNECOLOGY

## 2022-09-06 NOTE — PROGRESS NOTES
Abnormal bleeding note      Alexia Kemp is a 48 y.o. female who complains of vaginal bleeding problems. Her current method of family planning is OCP (estrogen/progesterone). She developed this problem approximately several months ago. She has had vaginal bleeding which she describes as heavy with clots. Associated symptoms include cramping. Alleviating factors: none    Aggravating factors: none      The patient is sexually active. Ultrasound followup    Alexia Kemp is a 48 y.o. female is here today to review the results of her ultrasound evaluation. Her U/S evaluation is performed because of a previous encounter revealing AUB which was identified several months ago. She is here for an initial ultrasound study. The sonogram: DIFFICULT SCAN DUE TO PATIENT BODY HABITUS. TA AND TV SCANS PERFORMED FOR BEST VISUALIZATION. UTERUS IS ANTEVERTED, NORMAL IN SIZE AND HETEROGENEOUS IN ECHOGENICITY. THERE APPEARS TO BE MULTIPLE FIBROIDS. THE MOST PROMINENT FIBROID IS INTRAMURAL AND MEASURES  42 X 32 X 33MM. ENDOMETRIUM NOT VISUALIZED DUE TO FIBROIDS. RIGHT OVARY APPEARS WITHIN NORMAL LIMITS. A FOLLICULAR CYST IS SEEN. LEFT OVARY NOT VISUALIZED. LEFT ADNEXA APPEARS WITHIN NORMAL LIMITS. NO FREE FLUID SEEN IN THE CDS. .    See detailed report for more information.       Her relevant past medical history:   Past Medical History:   Diagnosis Date    Abnormal Pap smear 2001    BRCA negative     Endometriosis     Fibroids     HSV-2 (herpes simplex virus 2) infection     Pap smear for cervical cancer screening 8/17/10 neg HPV NEG 9/24/12 neg 4/24/15 neg HPV NEG    SVT (supraventricular tachycardia) (HCC)         Past Surgical History:   Procedure Laterality Date    HX ADENOIDECTOMY      HX DILATION AND CURETTAGE      HX MYOMECTOMY      HX PELVIC LAPAROSCOPY      HX TONSILLECTOMY       Social History     Occupational History    Not on file   Tobacco Use    Smoking status: Never    Smokeless tobacco: Never   Substance and Sexual Activity    Alcohol use: No    Drug use: No    Sexual activity: Yes     Partners: Male     Birth control/protection: Pill     Family History   Problem Relation Age of Onset    Breast Cancer Mother        No Known Allergies  Prior to Admission medications    Medication Sig Start Date End Date Taking?  Authorizing Provider   norethindrone-e.estradioL-iron (Lo Loestrin Fe) 1 mg-10 mcg (24)/10 mcg (2) tab TAKE 1 TABLET BY MOUTH EVERY DAY, SKIP PLACEBOS TO ACHIEVE AMENORRHEA 4/26/22  Yes Adilene Topete MD        Review of Systems - History obtained from the patient  Constitutional: negative for weight loss, fever, night sweats  HEENT: negative for hearing loss, earache, congestion, snoring, sorethroat  CV: negative for chest pain, palpitations, edema  Resp: negative for cough, shortness of breath, wheezing  Breast: negative for breast lumps, nipple discharge, galactorrhea  GI: negative for change in bowel habits, abdominal pain, black or bloody stools  : negative for frequency, dysuria, hematuria  MSK: negative for back pain, joint pain, muscle pain  Skin: negative for itching, rash, hives  Neuro: negative for dizziness, headache, confusion, weakness  Psych: negative for anxiety, depression, change in mood  Heme/lymph: negative for bleeding, bruising, pallor      Objective:    Visit Vitals  LMP 07/30/2022          PHYSICAL EXAMINATION    Constitutional  Appearance: well-nourished, well developed, alert, in no acute distress    HENT  Head and Face: appears normal    Neck  Inspection/Palpation: normal appearance, no masses or tenderness  Lymph Nodes: no lymphadenopathy present  Thyroid: gland size normal, nontender, no nodules or masses present on palpation    Gastrointestinal  Abdominal Examination: abdomen non-tender to palpation, normal bowel sounds, no masses present  Liver and spleen: no hepatomegaly present, spleen not palpable  Hernias: no hernias identified    Genitourinary  External Genitalia: normal appearance for age, no discharge present, no tenderness present, no inflammatory lesions present, no masses present, no atrophy present  Vagina: normal vaginal vault without central or paravaginal defects, no discharge present, no inflammatory lesions present, no masses present  Bladder: non-tender to palpation  Urethra: appears normal  Cervix: normal   Uterus: normal size, shape and consistency  Adnexa: no adnexal tenderness present, no adnexal masses present  Perineum: perineum within normal limits, no evidence of trauma, no rashes or skin lesions present  Anus: anus within normal limits, no hemorrhoids present  Inguinal Lymph Nodes: no lymphadenopathy present    Skin  General Inspection: no rash, no lesions identified    Neurologic/Psychiatric  Mental Status:  Orientation: grossly oriented to person, place and time  Mood and Affect: mood normal, affect appropriate    Assessment/Plan:   Fibroid uterus/AUB- no significant improvement with ocps- discussed medicial vs surgical options. 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 Will switch to taking continuous ocps and fu with end bx results. Pt interested in undergoing hysterectomy and BSO. She will schedule pre-op appt with Dr. Demario Martini. Will schedule genetic counselor apt. Instructions given to pt. Handouts given to pt.       [facility]  OFFICE PROCEDURE PROGRESS NOTE        Chart reviewed for the following:   I, Claudeen Horsfall, have reviewed the History, Physical and updated the Allergic reactions for [address] performed immediately prior to start of procedure:   I, Claudeen Horsfall, have performed the following reviews on Yasmany Meza prior to the start of the procedure:            * Patient was identified by name and date of birth   * Agreement on procedure being performed was verified  * Risks and Benefits explained to the patient  * Procedure site verified and marked as necessary  * Patient was positioned for comfort  * Consent was signed and verified     Time: 3pm      Date of procedure: 2022    Procedure performed by:  Giovanni Chung MD    Provider assisted by: Adrian Eldridge     Patient assisted by: self    How tolerated by patient: tolerated the procedure well with no complications    Post Procedural Pain Scale: 2 - Hurts Little Bit    Comments: none    Procedure note: Endometrial biopsy    Jorge Mojica is a ,  48 y.o. female [address] [place] [address] Patient's last menstrual period was 2022. The patient has a history of There were no encounter diagnoses. and presents for an endometrial biopsy. Indications:   After the indications, risks, benefits, and alternatives to performing an endometrial biopsy were explained to the patient, her questions were answered and informed consent was obtained. Procedure: The patient was placed on the table in the dorsal lithotomy position. A bimanual exam showed the uterus to be anterior. The uterus was not enlarged. A speculum was placed in the vagina. The cervix was visualized and prepped with zephrin. A tenaculum was placed on the anterior lip of the cervix for traction. It was not necessary to dilate the cervix. A pipelle was passed through the endocervical canal without difficulty. The uterus was sounded to 7 cm's. A moderate amount of tissue was returned. This tissue was placed in formalin and sent to pathology. It was felt that an adequate sample was obtained. The patient tolerated the procedure well and she reported mild cramping. The tenaculum and speculum were removed. Post Procedural Status: The patient was observed for 10 minutes after the procedure. She had mild cramping at the time of discharge. There were no complications. The patient was discharged in stable condition. 1.5

## 2022-09-08 ENCOUNTER — PATIENT MESSAGE (OUTPATIENT)
Dept: OBGYN CLINIC | Age: 47
End: 2022-09-08

## 2022-09-08 LAB
CPT DISCLAIMER: NORMAL
DIAGNOSIS SYNOPSIS:: NORMAL
DX ICD CODE: NORMAL
PATH REPORT.FINAL DX SPEC: NORMAL
PATH REPORT.GROSS SPEC: NORMAL
PATH REPORT.SITE OF ORIGIN SPEC: NORMAL
PATHOLOGIST NAME: NORMAL
PAYMENT PROCEDURE: NORMAL

## 2022-09-15 NOTE — PROGRESS NOTES
Chief Complaint   Pre-op Exam      HPI  Kamryn Funes is a 48 y.o. female who presents for a pre op evaluation for Total Hysterectomy. Has had fibroids for years and previous laparoscopy for endometriosis. OCP has controlled her bleeding until the last year. Now heavier and irregular. US shows multiple fibroids and endometrium is not visible because of them. Chart reviewed, along with US. 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 Pt presents for counseling and possible scheduling of hysterectomy and BSO. No LMP recorded. Past Medical History:   Diagnosis Date    Abnormal Pap smear 2001    BRCA negative     Endometriosis     Fibroids     HSV-2 (herpes simplex virus 2) infection     Pap smear for cervical cancer screening 8/17/10 neg HPV NEG 9/24/12 neg 4/24/15 neg HPV NEG    SVT (supraventricular tachycardia) (HCC)      Past Surgical History:   Procedure Laterality Date    HX ADENOIDECTOMY      HX DILATION AND CURETTAGE      HX MYOMECTOMY      HX PELVIC LAPAROSCOPY      HX TONSILLECTOMY       Social History     Occupational History    Not on file   Tobacco Use    Smoking status: Never    Smokeless tobacco: Never   Substance and Sexual Activity    Alcohol use: No    Drug use: No    Sexual activity: Yes     Partners: Male     Birth control/protection: Pill     Family History   Problem Relation Age of Onset    Breast Cancer Mother        No Known Allergies  Prior to Admission medications    Medication Sig Start Date End Date Taking?  Authorizing Provider   norethindrone-e.estradioL-iron (Lo Loestrin Fe) 1 mg-10 mcg (24)/10 mcg (2) tab TAKE 1 TABLET BY MOUTH EVERY DAY, SKIP PLACEBOS TO ACHIEVE AMENORRHEA 4/26/22   Jaquelyn Phoenix, MD        Review of Systems: History obtained from the patient  Constitutional: negative for weight loss, fever, night sweats  Breast: negative for breast lumps, nipple discharge, galactorrhea  GI: negative for change in bowel habits, abdominal pain, black or bloody stools  : negative for frequency, dysuria, hematuria, vaginal discharge  MSK: negative for back pain, joint pain, muscle pain  Skin: negative for itching, rash, hives  Psych: negative for anxiety, depression, change in mood      Objective:  Visit Vitals  /73   Pulse 89   Wt 173 lb (78.5 kg)   LMP 07/28/2022 (Approximate)   BMI 29.70 kg/m²       Physical Exam:   PHYSICAL EXAMINATION    Constitutional  Appearance: well-nourished, well developed, alert, in no acute distress      Skin  General Inspection: no rash, no lesions identified    Neurologic/Psychiatric  Mental Status:  Orientation: grossly oriented to person, place and time  Mood and Affect: mood normal, affect appropriate    Assessment:   Fibroids. Abnormal bleeding. Negative endometrial bx per Dr. Krystian Hernandez. Increased risk of ovarian cancer    Plan:   IC obtained for LSH/BSO, including morcellation. Pt states she has NFQ. I spent 30 minutes with the patient, more than half of which was face to face counseling. RTO prn if symptoms persist or worsen. Instructions given to pt. Handouts given to pt.

## 2022-09-16 ENCOUNTER — OFFICE VISIT (OUTPATIENT)
Dept: OBGYN CLINIC | Age: 47
End: 2022-09-16
Payer: COMMERCIAL

## 2022-09-16 VITALS
BODY MASS INDEX: 29.7 KG/M2 | HEART RATE: 89 BPM | DIASTOLIC BLOOD PRESSURE: 73 MMHG | SYSTOLIC BLOOD PRESSURE: 116 MMHG | WEIGHT: 173 LBS

## 2022-09-16 DIAGNOSIS — D21.9 FIBROIDS: ICD-10-CM

## 2022-09-16 DIAGNOSIS — Z14.8 GENETIC CARRIER OF HERITABLE CANCER: ICD-10-CM

## 2022-09-16 DIAGNOSIS — N92.0 MENORRHAGIA WITH REGULAR CYCLE: Primary | ICD-10-CM

## 2022-09-16 DIAGNOSIS — N93.9 ABNORMAL UTERINE BLEEDING (AUB): ICD-10-CM

## 2022-09-16 PROCEDURE — 99214 OFFICE O/P EST MOD 30 MIN: CPT | Performed by: OBSTETRICS & GYNECOLOGY

## 2022-10-18 ENCOUNTER — TELEPHONE (OUTPATIENT)
Dept: OBGYN CLINIC | Age: 47
End: 2022-10-18

## 2022-10-18 NOTE — TELEPHONE ENCOUNTER
Gabino Hickman  to Patient Medical Advice Request [facility]    6:11 PM  Good evening! I received a message today regarding the [place] paperwork I faxed earlier this week. I cannot remember than name of the person that contacted me. Dr. Mirta Stockton will be performing the surgery. I plan to be out of work , for my surgery, November 18th through December 2nd. I plan to return to work on Monday, December 5th. Thank you so much!      Jeffery Rubalcava      48year old patient last seen in the office on 9/16/2022 and has surgery scheduled for 11/18/20222    Patient can be reached at the confirmed phone number in the chart    Thank you

## 2022-11-07 ENCOUNTER — HOSPITAL ENCOUNTER (OUTPATIENT)
Dept: PREADMISSION TESTING | Age: 47
Discharge: HOME | End: 2022-11-07
Payer: COMMERCIAL

## 2022-11-07 VITALS
WEIGHT: 174.69 LBS | DIASTOLIC BLOOD PRESSURE: 67 MMHG | HEART RATE: 71 BPM | TEMPERATURE: 97.3 F | RESPIRATION RATE: 16 BRPM | BODY MASS INDEX: 29.82 KG/M2 | SYSTOLIC BLOOD PRESSURE: 133 MMHG | HEIGHT: 64 IN | OXYGEN SATURATION: 100 %

## 2022-11-07 DIAGNOSIS — R10.2 PELVIC PAIN IN FEMALE: ICD-10-CM

## 2022-11-07 DIAGNOSIS — N92.0 MENORRHAGIA WITH REGULAR CYCLE: ICD-10-CM

## 2022-11-07 DIAGNOSIS — D25.9 UTERINE LEIOMYOMA, UNSPECIFIED LOCATION: ICD-10-CM

## 2022-11-07 LAB
ABO + RH BLD: NORMAL
ATRIAL RATE: 69 BPM
BLOOD GROUP ANTIBODIES SERPL: NORMAL
CALCULATED P AXIS, ECG09: 27 DEGREES
CALCULATED R AXIS, ECG10: 49 DEGREES
CALCULATED T AXIS, ECG11: 14 DEGREES
DIAGNOSIS, 93000: NORMAL
ERYTHROCYTE [DISTWIDTH] IN BLOOD BY AUTOMATED COUNT: 12.2 % (ref 11.5–14.5)
HCG SERPL QL: NEGATIVE
HCT VFR BLD AUTO: 40 % (ref 35–47)
HGB BLD-MCNC: 12.5 G/DL (ref 11.5–16)
MCH RBC QN AUTO: 28.7 PG (ref 26–34)
MCHC RBC AUTO-ENTMCNC: 31.3 G/DL (ref 30–36.5)
MCV RBC AUTO: 92 FL (ref 80–99)
NRBC # BLD: 0 K/UL (ref 0–0.01)
NRBC BLD-RTO: 0 PER 100 WBC
P-R INTERVAL, ECG05: 132 MS
PLATELET # BLD AUTO: 305 K/UL (ref 150–400)
PMV BLD AUTO: 10.2 FL (ref 8.9–12.9)
Q-T INTERVAL, ECG07: 396 MS
QRS DURATION, ECG06: 76 MS
QTC CALCULATION (BEZET), ECG08: 424 MS
RBC # BLD AUTO: 4.35 M/UL (ref 3.8–5.2)
SPECIMEN EXP DATE BLD: NORMAL
VENTRICULAR RATE, ECG03: 69 BPM
WBC # BLD AUTO: 6 K/UL (ref 3.6–11)

## 2022-11-07 PROCEDURE — 86900 BLOOD TYPING SEROLOGIC ABO: CPT

## 2022-11-07 PROCEDURE — 93005 ELECTROCARDIOGRAM TRACING: CPT

## 2022-11-07 PROCEDURE — 36415 COLL VENOUS BLD VENIPUNCTURE: CPT

## 2022-11-07 PROCEDURE — 85027 COMPLETE CBC AUTOMATED: CPT

## 2022-11-07 PROCEDURE — 84703 CHORIONIC GONADOTROPIN ASSAY: CPT

## 2022-11-07 RX ORDER — LANOLIN ALCOHOL/MO/W.PET/CERES
1000 CREAM (GRAM) TOPICAL DAILY
COMMUNITY

## 2022-11-07 RX ORDER — NAPROXEN SODIUM 220 MG
220 TABLET ORAL AS NEEDED
COMMUNITY

## 2022-11-07 RX ORDER — IBUPROFEN 200 MG
400 TABLET ORAL AS NEEDED
COMMUNITY

## 2022-11-07 RX ORDER — LORATADINE 10 MG/1
10 TABLET ORAL DAILY
COMMUNITY

## 2022-11-07 RX ORDER — SUMATRIPTAN 25 MG/1
25 TABLET, FILM COATED ORAL AS NEEDED
COMMUNITY

## 2022-11-07 RX ORDER — FLUTICASONE PROPIONATE 50 MCG
2 SPRAY, SUSPENSION (ML) NASAL DAILY
COMMUNITY

## 2022-11-07 NOTE — PERIOP NOTES
[facility] [address], [address] [facility]   MAIN OR                                  [phone]   MAIN PRE OP                          [phone]                                                                                AMBULATORY PRE OP          [phone]  PRE-ADMISSION TESTING    [phone]   Surgery Date:  Friday, 11/18/2022       Is surgery arrival time given by surgeon? NO  If NO, [facility] staff will call you between 3 and 7pm the day before your surgery with your arrival time. (If your surgery is on a Monday, we will call you the Friday before.)    Call [phone] after 7pm Monday-Friday if you did not receive this call. INSTRUCTIONS BEFORE YOUR SURGERY   When You  Arrive Arrive at the [address]nd [address] [facility] on the day of your surgery  Have your insurance card, photo ID, and any copayment (if needed)   Food   and   Drink NO food or drink after midnight the night before surgery    This means NO water, gum, mints, coffee, juice, etc.  No alcohol (beer, wine, liquor) 24 hours before and after surgery   Medications to   TAKE   Morning of Surgery MEDICATIONS TO TAKE THE MORNING OF SURGERY WITH A SIP OF WATER:   Claritin and Flonase  OK to take Sumatriptan if needed   Medications  To  STOP      7 days before surgery Non-Steroidal anti-inflammatory Drugs (NSAID's): for example, Ibuprofen (Advil, Motrin), Naproxen (Aleve)  Aspirin, if taking for pain   Herbal supplements, vitamins, and fish oil (including your multi-vitamin, Elderberry, and B12)  Other:  (Pain medications not listed above, including Tylenol may be taken)   Blood  Thinners If you take  Aspirin, Plavix, Coumadin, or any blood-thinning or anti-blood clot medicine, talk to the doctor who prescribed the medications for pre-operative instructions.    Bathing Clothing  Jewelry  Valuables     If you shower the morning of surgery, please do not apply anything to your skin (lotions, powders, deodorant, or makeup, especially mascara)  Follow [employer] body wash instructions provided to you during PAT appointment. Begin 3 days prior to surgery. Do not shave or trim anywhere 24 hours before surgery  Wear your hair loose or down; no pony-tails, buns, or metal hair clips  Wear loose, comfortable, clean clothes  Wear glasses instead of contacts  Leave money, valuables, and jewelry, including body piercings, at home  If you were given an [employer], bring it on day of surgery. Going Home - or Spending the Night SAME-DAY SURGERY: You must have a responsible adult drive you home and stay with you 24 hours after surgery  ADMITS: If your doctor is keeping you in the hospital after surgery, leave personal belongings/luggage in your car until you have a hospital room number. Hospital discharge time is 12 noon  Drivers must be here before 12 noon unless you are told differently   Special Instructions Free  parking from 7am-5pm.       Follow all instructions so your surgery wont be cancelled. Please, be on time. If a situation occurs and you are delayed the day of surgery, call [phone]. If your physical condition changes (like a fever, cold, flu, etc.) call your surgeon. Home medication(s) reviewed and verified via  LIST   [employer]   during PAT appointment. The patient was contacted by IN-PERSON  The patient verbalizes understanding of all instructions and DOES NOT   need reinforcement.

## 2022-12-16 ENCOUNTER — ANESTHESIA EVENT (OUTPATIENT)
Dept: SURGERY | Age: 47
End: 2022-12-16
Payer: COMMERCIAL

## 2022-12-19 ENCOUNTER — HOSPITAL ENCOUNTER (OUTPATIENT)
Age: 47
Setting detail: OUTPATIENT SURGERY
Discharge: HOME | End: 2022-12-19
Attending: OBSTETRICS & GYNECOLOGY | Admitting: OBSTETRICS & GYNECOLOGY
Payer: COMMERCIAL

## 2022-12-19 ENCOUNTER — ANESTHESIA (OUTPATIENT)
Dept: SURGERY | Age: 47
End: 2022-12-19
Payer: COMMERCIAL

## 2022-12-19 VITALS
WEIGHT: 171.08 LBS | HEART RATE: 55 BPM | TEMPERATURE: 98 F | OXYGEN SATURATION: 100 % | SYSTOLIC BLOOD PRESSURE: 122 MMHG | HEIGHT: 64 IN | BODY MASS INDEX: 29.21 KG/M2 | DIASTOLIC BLOOD PRESSURE: 72 MMHG | RESPIRATION RATE: 16 BRPM

## 2022-12-19 DIAGNOSIS — D25.9 UTERINE LEIOMYOMA, UNSPECIFIED LOCATION: ICD-10-CM

## 2022-12-19 DIAGNOSIS — G89.18 POSTOPERATIVE PAIN: Primary | ICD-10-CM

## 2022-12-19 DIAGNOSIS — N92.0 MENORRHAGIA WITH REGULAR CYCLE: ICD-10-CM

## 2022-12-19 DIAGNOSIS — R10.2 PELVIC PAIN IN FEMALE: ICD-10-CM

## 2022-12-19 LAB
ABO + RH BLD: NORMAL
BLOOD GROUP ANTIBODIES SERPL: NORMAL
HCG UR QL: NEGATIVE
SPECIMEN EXP DATE BLD: NORMAL

## 2022-12-19 PROCEDURE — 74011250636 HC RX REV CODE- 250/636: Performed by: OBSTETRICS & GYNECOLOGY

## 2022-12-19 PROCEDURE — 58542 LSH W/T/O UT 250 G OR LESS: CPT | Performed by: OBSTETRICS & GYNECOLOGY

## 2022-12-19 PROCEDURE — 77030003575 HC NDL INSUF ENDOPTH J&J -B: Performed by: OBSTETRICS & GYNECOLOGY

## 2022-12-19 PROCEDURE — 76210000020 HC REC RM PH II FIRST 0.5 HR: Performed by: OBSTETRICS & GYNECOLOGY

## 2022-12-19 PROCEDURE — 36415 COLL VENOUS BLD VENIPUNCTURE: CPT

## 2022-12-19 PROCEDURE — 74011250636 HC RX REV CODE- 250/636: Performed by: ANESTHESIOLOGY

## 2022-12-19 PROCEDURE — 76010000131 HC OR TIME 2 TO 2.5 HR: Performed by: OBSTETRICS & GYNECOLOGY

## 2022-12-19 PROCEDURE — 74011000250 HC RX REV CODE- 250: Performed by: NURSE ANESTHETIST, CERTIFIED REGISTERED

## 2022-12-19 PROCEDURE — 77030013079 HC BLNKT BAIR HGGR 3M -A: Performed by: ANESTHESIOLOGY

## 2022-12-19 PROCEDURE — 81025 URINE PREGNANCY TEST: CPT

## 2022-12-19 PROCEDURE — 88307 TISSUE EXAM BY PATHOLOGIST: CPT

## 2022-12-19 PROCEDURE — 77030040922 HC BLNKT HYPOTHRM STRY -A

## 2022-12-19 PROCEDURE — 88342 IMHCHEM/IMCYTCHM 1ST ANTB: CPT

## 2022-12-19 PROCEDURE — 77030012770 HC TRCR OPT FX AMR -B: Performed by: OBSTETRICS & GYNECOLOGY

## 2022-12-19 PROCEDURE — 76060000035 HC ANESTHESIA 2 TO 2.5 HR: Performed by: OBSTETRICS & GYNECOLOGY

## 2022-12-19 PROCEDURE — 77030014090 HC TRCR OPT AMR -B: Performed by: OBSTETRICS & GYNECOLOGY

## 2022-12-19 PROCEDURE — 77030031139 HC SUT VCRL2 J&J -A: Performed by: OBSTETRICS & GYNECOLOGY

## 2022-12-19 PROCEDURE — 77030008684 HC TU ET CUF COVD -B: Performed by: ANESTHESIOLOGY

## 2022-12-19 PROCEDURE — C1765 ADHESION BARRIER: HCPCS | Performed by: OBSTETRICS & GYNECOLOGY

## 2022-12-19 PROCEDURE — 74011250636 HC RX REV CODE- 250/636: Performed by: NURSE ANESTHETIST, CERTIFIED REGISTERED

## 2022-12-19 PROCEDURE — C1782 MORCELLATOR: HCPCS | Performed by: OBSTETRICS & GYNECOLOGY

## 2022-12-19 PROCEDURE — 77030020061 HC IV BLD WRMR ADMIN SET 3M -B: Performed by: ANESTHESIOLOGY

## 2022-12-19 PROCEDURE — 77030026438 HC STYL ET INTUB CARD -A: Performed by: ANESTHESIOLOGY

## 2022-12-19 PROCEDURE — 77030008608 HC TRCR ENDOSC SMTH AMR -B: Performed by: OBSTETRICS & GYNECOLOGY

## 2022-12-19 PROCEDURE — 2709999900 HC NON-CHARGEABLE SUPPLY: Performed by: OBSTETRICS & GYNECOLOGY

## 2022-12-19 PROCEDURE — 74011000250 HC RX REV CODE- 250: Performed by: OBSTETRICS & GYNECOLOGY

## 2022-12-19 PROCEDURE — 86900 BLOOD TYPING SEROLOGIC ABO: CPT

## 2022-12-19 PROCEDURE — 76210000017 HC OR PH I REC 1.5 TO 2 HR: Performed by: OBSTETRICS & GYNECOLOGY

## 2022-12-19 PROCEDURE — 77030040361 HC SLV COMPR DVT MDII -B

## 2022-12-19 RX ORDER — SODIUM CHLORIDE 0.9 % (FLUSH) 0.9 %
5-40 SYRINGE (ML) INJECTION EVERY 8 HOURS
Status: DISCONTINUED | OUTPATIENT
Start: 2022-12-19 | End: 2022-12-19 | Stop reason: HOSPADM

## 2022-12-19 RX ORDER — SODIUM CHLORIDE 0.9 % (FLUSH) 0.9 %
5-40 SYRINGE (ML) INJECTION AS NEEDED
Status: DISCONTINUED | OUTPATIENT
Start: 2022-12-19 | End: 2022-12-19 | Stop reason: HOSPADM

## 2022-12-19 RX ORDER — HYDROMORPHONE HYDROCHLORIDE 2 MG/ML
INJECTION, SOLUTION INTRAMUSCULAR; INTRAVENOUS; SUBCUTANEOUS AS NEEDED
Status: DISCONTINUED | OUTPATIENT
Start: 2022-12-19 | End: 2022-12-19 | Stop reason: HOSPADM

## 2022-12-19 RX ORDER — FLUMAZENIL 0.1 MG/ML
0.2 INJECTION INTRAVENOUS
Status: DISCONTINUED | OUTPATIENT
Start: 2022-12-19 | End: 2022-12-19 | Stop reason: HOSPADM

## 2022-12-19 RX ORDER — ONDANSETRON 2 MG/ML
4 INJECTION INTRAMUSCULAR; INTRAVENOUS AS NEEDED
Status: DISCONTINUED | OUTPATIENT
Start: 2022-12-19 | End: 2022-12-19 | Stop reason: HOSPADM

## 2022-12-19 RX ORDER — FENTANYL CITRATE 50 UG/ML
25 INJECTION, SOLUTION INTRAMUSCULAR; INTRAVENOUS
Status: DISCONTINUED | OUTPATIENT
Start: 2022-12-19 | End: 2022-12-19 | Stop reason: HOSPADM

## 2022-12-19 RX ORDER — LIDOCAINE HYDROCHLORIDE 10 MG/ML
0.1 INJECTION, SOLUTION EPIDURAL; INFILTRATION; INTRACAUDAL; PERINEURAL AS NEEDED
Status: DISCONTINUED | OUTPATIENT
Start: 2022-12-19 | End: 2022-12-19 | Stop reason: HOSPADM

## 2022-12-19 RX ORDER — GLYCOPYRROLATE 0.2 MG/ML
INJECTION INTRAMUSCULAR; INTRAVENOUS AS NEEDED
Status: DISCONTINUED | OUTPATIENT
Start: 2022-12-19 | End: 2022-12-19 | Stop reason: HOSPADM

## 2022-12-19 RX ORDER — DEXAMETHASONE SODIUM PHOSPHATE 4 MG/ML
INJECTION, SOLUTION INTRA-ARTICULAR; INTRALESIONAL; INTRAMUSCULAR; INTRAVENOUS; SOFT TISSUE AS NEEDED
Status: DISCONTINUED | OUTPATIENT
Start: 2022-12-19 | End: 2022-12-19 | Stop reason: HOSPADM

## 2022-12-19 RX ORDER — NEOSTIGMINE METHYLSULFATE 1 MG/ML
INJECTION, SOLUTION INTRAVENOUS AS NEEDED
Status: DISCONTINUED | OUTPATIENT
Start: 2022-12-19 | End: 2022-12-19 | Stop reason: HOSPADM

## 2022-12-19 RX ORDER — MIDAZOLAM HYDROCHLORIDE 1 MG/ML
INJECTION, SOLUTION INTRAMUSCULAR; INTRAVENOUS AS NEEDED
Status: DISCONTINUED | OUTPATIENT
Start: 2022-12-19 | End: 2022-12-19 | Stop reason: HOSPADM

## 2022-12-19 RX ORDER — HYDROCODONE BITARTRATE AND ACETAMINOPHEN 7.5; 325 MG/1; MG/1
1 TABLET ORAL
Qty: 24 TABLET | Refills: 0 | Status: SHIPPED | OUTPATIENT
Start: 2022-12-19 | End: 2022-12-26

## 2022-12-19 RX ORDER — ESTRADIOL 0.1 MG/D
1 PATCH TRANSDERMAL
Qty: 12 PATCH | Refills: 4 | Status: SHIPPED | OUTPATIENT
Start: 2022-12-24

## 2022-12-19 RX ORDER — NALOXONE HYDROCHLORIDE 0.4 MG/ML
0.04 INJECTION, SOLUTION INTRAMUSCULAR; INTRAVENOUS; SUBCUTANEOUS
Status: DISCONTINUED | OUTPATIENT
Start: 2022-12-19 | End: 2022-12-19 | Stop reason: HOSPADM

## 2022-12-19 RX ORDER — HYDROMORPHONE HYDROCHLORIDE 1 MG/ML
.25-1 INJECTION, SOLUTION INTRAMUSCULAR; INTRAVENOUS; SUBCUTANEOUS
Status: DISCONTINUED | OUTPATIENT
Start: 2022-12-19 | End: 2022-12-19 | Stop reason: HOSPADM

## 2022-12-19 RX ORDER — KETOROLAC TROMETHAMINE 30 MG/ML
INJECTION, SOLUTION INTRAMUSCULAR; INTRAVENOUS AS NEEDED
Status: DISCONTINUED | OUTPATIENT
Start: 2022-12-19 | End: 2022-12-19 | Stop reason: HOSPADM

## 2022-12-19 RX ORDER — ONDANSETRON 2 MG/ML
INJECTION INTRAMUSCULAR; INTRAVENOUS AS NEEDED
Status: DISCONTINUED | OUTPATIENT
Start: 2022-12-19 | End: 2022-12-19

## 2022-12-19 RX ORDER — ONDANSETRON 2 MG/ML
INJECTION INTRAMUSCULAR; INTRAVENOUS AS NEEDED
Status: DISCONTINUED | OUTPATIENT
Start: 2022-12-19 | End: 2022-12-19 | Stop reason: HOSPADM

## 2022-12-19 RX ORDER — DIPHENHYDRAMINE HYDROCHLORIDE 50 MG/ML
12.5 INJECTION, SOLUTION INTRAMUSCULAR; INTRAVENOUS AS NEEDED
Status: DISCONTINUED | OUTPATIENT
Start: 2022-12-19 | End: 2022-12-19 | Stop reason: HOSPADM

## 2022-12-19 RX ORDER — ALBUTEROL SULFATE 0.83 MG/ML
2.5 SOLUTION RESPIRATORY (INHALATION) AS NEEDED
Status: DISCONTINUED | OUTPATIENT
Start: 2022-12-19 | End: 2022-12-19 | Stop reason: HOSPADM

## 2022-12-19 RX ORDER — LIDOCAINE HYDROCHLORIDE 20 MG/ML
INJECTION, SOLUTION EPIDURAL; INFILTRATION; INTRACAUDAL; PERINEURAL AS NEEDED
Status: DISCONTINUED | OUTPATIENT
Start: 2022-12-19 | End: 2022-12-19 | Stop reason: HOSPADM

## 2022-12-19 RX ORDER — SODIUM CHLORIDE, SODIUM LACTATE, POTASSIUM CHLORIDE, CALCIUM CHLORIDE 600; 310; 30; 20 MG/100ML; MG/100ML; MG/100ML; MG/100ML
125 INJECTION, SOLUTION INTRAVENOUS CONTINUOUS
Status: DISCONTINUED | OUTPATIENT
Start: 2022-12-19 | End: 2022-12-19 | Stop reason: HOSPADM

## 2022-12-19 RX ORDER — ROCURONIUM BROMIDE 10 MG/ML
INJECTION, SOLUTION INTRAVENOUS AS NEEDED
Status: DISCONTINUED | OUTPATIENT
Start: 2022-12-19 | End: 2022-12-19 | Stop reason: HOSPADM

## 2022-12-19 RX ORDER — PROPOFOL 10 MG/ML
INJECTION, EMULSION INTRAVENOUS
Status: DISCONTINUED | OUTPATIENT
Start: 2022-12-19 | End: 2022-12-19 | Stop reason: HOSPADM

## 2022-12-19 RX ORDER — ONDANSETRON 8 MG/1
8 TABLET, ORALLY DISINTEGRATING ORAL
Qty: 30 TABLET | Refills: 2 | Status: SHIPPED | OUTPATIENT
Start: 2022-12-19

## 2022-12-19 RX ORDER — PROPOFOL 10 MG/ML
INJECTION, EMULSION INTRAVENOUS AS NEEDED
Status: DISCONTINUED | OUTPATIENT
Start: 2022-12-19 | End: 2022-12-19 | Stop reason: HOSPADM

## 2022-12-19 RX ADMIN — PROPOFOL 150 MG: 10 INJECTION, EMULSION INTRAVENOUS at 07:31

## 2022-12-19 RX ADMIN — CEFAZOLIN SODIUM 2 G: 1 POWDER, FOR SOLUTION INTRAMUSCULAR; INTRAVENOUS at 07:53

## 2022-12-19 RX ADMIN — KETOROLAC TROMETHAMINE 30 MG: 30 INJECTION, SOLUTION INTRAMUSCULAR; INTRAVENOUS at 09:13

## 2022-12-19 RX ADMIN — GLYCOPYRROLATE 0.4 MG: 0.2 INJECTION INTRAMUSCULAR; INTRAVENOUS at 09:13

## 2022-12-19 RX ADMIN — ROCURONIUM BROMIDE 30 MG: 10 INJECTION INTRAVENOUS at 07:31

## 2022-12-19 RX ADMIN — HYDROMORPHONE HYDROCHLORIDE 0.5 MG: 1 INJECTION, SOLUTION INTRAMUSCULAR; INTRAVENOUS; SUBCUTANEOUS at 09:57

## 2022-12-19 RX ADMIN — HYDROMORPHONE HYDROCHLORIDE 0.5 MG: 1 INJECTION, SOLUTION INTRAMUSCULAR; INTRAVENOUS; SUBCUTANEOUS at 10:50

## 2022-12-19 RX ADMIN — ONDANSETRON 4 MG: 2 INJECTION INTRAMUSCULAR; INTRAVENOUS at 11:53

## 2022-12-19 RX ADMIN — SODIUM CHLORIDE, POTASSIUM CHLORIDE, SODIUM LACTATE AND CALCIUM CHLORIDE: 600; 310; 30; 20 INJECTION, SOLUTION INTRAVENOUS at 08:37

## 2022-12-19 RX ADMIN — HYDROMORPHONE HYDROCHLORIDE 1 MG: 2 INJECTION, SOLUTION INTRAMUSCULAR; INTRAVENOUS; SUBCUTANEOUS at 07:26

## 2022-12-19 RX ADMIN — LIDOCAINE HYDROCHLORIDE 80 MG: 20 INJECTION, SOLUTION EPIDURAL; INFILTRATION; INTRACAUDAL; PERINEURAL at 07:31

## 2022-12-19 RX ADMIN — SODIUM CHLORIDE, POTASSIUM CHLORIDE, SODIUM LACTATE AND CALCIUM CHLORIDE 125 ML/HR: 600; 310; 30; 20 INJECTION, SOLUTION INTRAVENOUS at 06:30

## 2022-12-19 RX ADMIN — DEXAMETHASONE SODIUM PHOSPHATE 8 MG: 4 INJECTION, SOLUTION INTRAMUSCULAR; INTRAVENOUS at 07:53

## 2022-12-19 RX ADMIN — NEOSTIGMINE METHYLSULFATE 3 MG: 1 INJECTION, SOLUTION INTRAVENOUS at 09:13

## 2022-12-19 RX ADMIN — PROPOFOL 25 MCG/KG/MIN: 10 INJECTION, EMULSION INTRAVENOUS at 07:33

## 2022-12-19 RX ADMIN — ROCURONIUM BROMIDE 20 MG: 10 INJECTION INTRAVENOUS at 08:01

## 2022-12-19 RX ADMIN — ONDANSETRON HYDROCHLORIDE 4 MG: 2 SOLUTION INTRAMUSCULAR; INTRAVENOUS at 07:53

## 2022-12-19 RX ADMIN — MIDAZOLAM HYDROCHLORIDE 2 MG: 1 INJECTION, SOLUTION INTRAMUSCULAR; INTRAVENOUS at 07:26

## 2022-12-19 NOTE — DISCHARGE INSTRUCTIONS
Laparoscopic hysterectomy  Procedure-specific postoperative instructions  General Instructions   Make an appointment to come back to the office in about 2-3 weeks. Eat and drink your normal diet. Take laxatives as needed. Call us if you have questions or problems. Incision care   With this procedure you will have 3 small incisions. One or more may be more sensitive than the others. Ice packs or heating pad (low setting--don't burn yourself) can be helpful. Treat these incisions like normal skin. You can shower and wash this skin as you normally would. You need to call the office if there is spreading redness around the incision, it feels hot, or has drainage other than just a mild blood-tinged appearance. Supracervical Hysterectomy   If the uterus was removed but the cervix was not removed, then you have NO restriction. This may be hard to believe, but you can literally do anything you want to do and no damage will occur. Now, you may not FEEL like doing much. Any major surgery is a stress on your body. So you may be tired, have no energy, may feel weak and listless. But no damage will occur if you lift or do strenuous activity. Listen to your body. If it is telling you to rest, do so. If you feel good, you won't hurt anything by going shopping or doing household activities. Total Hysterectomy   If the uterus and cervix were removed, recovery is different. The top of the vagina where the cervix was is now closed with suture. That area has to heal before you do strenuous activities, put anything in the vagina, or lift anything that you feel you have to strain, typically that would be 15 pounds or more. The only thing holding your insides (intestines) in place are the sutures holding the top of the vagina closed, so the last thing you want is for that to fail. Abstaining from sex is mandatory for 6 weeks. Infection at that area is a possible cause of failure.   Symptoms of that could be pain getting worse in the pelvic area instead of better, fever, or a change from the normal discharge to having a foul odor. Call us if those things are happening. Antibiotics will usually clear that up quickly. Regarding Ovaries   If your ovaries (or one ovary) were left in place you will not need hormones. There may be some fluctuations in hormone levels and resulting temperature or mood issues but these should be mild and temporary. If your ovaries have been removed and you are not already in menopause, you will need to use replacement hormones (unless you've had breast cancer or blood clots in lungs or legs in the past). Within 48 hours after removal of ovaries, hormone levels fall and most women will have some degree of menopausal symptoms. These can be very difficult. We use an estrogen patch for at least the first 6 weeks after surgery. This will alleviate nearly all of the symptoms you would have. Dr. Brooklyn Yao will discuss the long term use of estrogen with you at the postoperative visit. If you are already in menopause, you will not notice any difference after the ovaries are removed and should not need hormones. The patch is used postoperatively because it has minimal increase in the risk of blood clots, etc.  If you have any problems with the patch, call the office. If you were already on hormones prior to surgery, you may be able to stop one of the two usual replacement hormones. Discuss that with Dr. Brooklyn Yao at your postop checkup. Follow-up care is a key part of your treatment and safety. If you do not already have an appointment, call the office to make one usually in 2-3 weeks. When should you call for help? Call 911 anytime you think you may need emergency care. For example, call if:  You passed out (lost consciousness). You have sudden chest pain and shortness of breath, or you cough up blood. You have persistant severe pain in your belly.   Call the office during office hours if:  You have pain that does not get better after you take pain medicine. You have loose stitches, or an incision comes open. You are bleeding or have new drainage from an incision. You develop a hard lump at an incision. You have signs of infection, such as: Increased pain, swelling, warmth, or redness. Red streaks leading from an incision. Pus draining from an incision. Swollen lymph nodes in your neck, armpits, or groin  A fever    DISCHARGE SUMMARY from your Nurse      PATIENT INSTRUCTIONS    After general anesthesia or intravenous sedation, for 24 hours or while taking prescription Narcotics:  Limit your activities  Do not drive and operate hazardous machinery  Do not make important personal or business decisions  Do  not drink alcoholic beverages  If you have not urinated within 8 hours after discharge, please contact your surgeon on call. Report the following to your surgeon:  Excessive pain, swelling, redness or odor of or around the surgical area  Temperature over 100.5  Nausea and vomiting lasting longer than 4 hours or if unable to take medications  Any signs of decreased circulation or nerve impairment to extremity: change in color, persistent  numbness, tingling, coldness or increase pain  Any questions      GOOD HELP TO FIGHT AN INFECTION  Here are a few tip to help reduce the chance of getting an infection after surgery:  Wash Your Hands  Good handwashing is the most important thing you and your caregiver can do. Wash before and after caring for any wounds. Dry your hand with a clean towel. Wash with soap and water for at least 20 seconds. A TIP: sing the \"Happy Birthday\" song through one time while washing to help with the timing. Use a hand  in between washings.     Shower  When your surgeon says it is OK to take a shower, use a new bar of antibacterial soap (if that is what you use, and keep that bar of soap ONLY for your use), or antibacterial body wash.  Use a clean wash cloth or sponge when you bathe. Dry off with a clean towel  after every bath - be careful around any wounds, skin staples, sutures or surgical glue over/on wounds. Do not enter swimming pools, hot tubs, lakes, rivers and/or ocean until wounds are healed and your doctor/surgeon says it is OK. Use Clean Sheets  Sleep on freshly laundered sheets after your surgery. Keep the surgery site covered with a clean, dry bandage (if instructed to do so). If the bandage becomes soiled, reapply a new, dry, clean bandage. Do not allow pets to sleep with you while your wound is healing. Lifestyle Modification and Controlling Your Blood Sugar  Smoking slows wound healing. Stop smoking and limit exposure to second-hand smoke. High blood sugar slows wound healing. Eat a well-balanced diet to provide proper nutrition while healing  Monitor your blood sugar (if you are a diabetic) and take your medications as you are suppose to so you can control you blood sugar after surgery. COUGH AND DEEP BREATHE    Breathing deeply and coughing are very important exercises to do after surgery. Deep breathing and coughing open the little air tubes and air sacks in your lungs. You take deep breaths every day. You may not even notice - it is just something you do when you sigh or yawn. It is a natural exercise you do to keep these air passages open. After surgery, take deep breaths and cough, on purpose. DIRECTIONS:  Take 10 to 15 slow deep breaths every hour while awake. Breathe in deeply, and hold it for 2 seconds. Exhale slowly through puckered lips, like blowing up a balloon. After every 4th or 5th deep breath, hug your pillow to your chest or belly and give a hard, deep cough. Yes, it will probably hurt. But doing this exercise is a very important part of healing after surgery. Take your pain medicine to help you do this exercise without too much pain.     Coughing and deep breathing help prevent bronchitis and pneumonia after surgery. If you had chest or belly surgery, use a pillow as a \"hug naeem\" and hold it tightly to your chest or belly when you cough. ANKLE PUMPS    Ankle pumps increase the circulation of oxygenated blood to your lower extremities and decrease your risk for circulation problems such as blood clots. They also stretch the muscles, tendons and ligaments in your foot and ankle, and prevent joint contracture in the ankle and foot, especially after surgeries on the legs. It is important to do ankle pump exercises regularly after surgery because immobility increases your risk for developing a blood clot. Your doctor may also have you take an Aspirin for the next few days as well. If your doctor did not ask you to take an Aspirin, consult with him before starting Aspirin therapy on your own. The exercise is quite simple. Slowly point your foot forward, feeling the muscles on the top of your lower leg stretch, and hold this position for 5 seconds. Next, pull your foot back toward you as far as possible, stretching the calf muscles, and hold that position for 5 seconds. Repeat with the other foot. Perform 10 repetitions every hour while awake for both ankles if possible (down and then up with the foot once is one repetition). You should feel gentle stretching of the muscles in your lower leg when doing this exercise. If you feel pain, or your range of motion is limited, don't push too hard. Only go the limit your joint and muscles will let you go. If you have increasing pain, progressively worsening leg warmth or swelling, STOP the exercise and call your doctor. MEDICATION AND   SIDE EFFECT GUIDE    The [facility] MEDICATION AND SIDE EFFECT GUIDE was provided to the PATIENT AND CARE PROVIDER.   Information provided includes instruction about drug purpose and common side effects for the following medications:   Estradiol, Hydrocodone, & Zofran        These are general instructions for a healthy lifestyle:    *   Please give a list of your current medications to your Primary Care Provider. *   Please update this list whenever your medications are discontinued, doses are changed, or new medications (including over-the-counter products) are added. *   Please carry medication information at all times in case of emergency situations. About Smoking  No smoking / No tobacco products  Avoid exposure to second hand smoke     Surgeon General's Warning:  Quitting smoking now greatly reduces serious risk to your health. Obesity, smoking, and sedentary lifestyle greatly increases your risk for illness and disease. A healthy diet, regular physical exercise & weight monitoring are important for maintaining a healthy lifestyle. Congestive Heart Failure  You may be retaining fluid if you have a history of heart failure or if you experience any of the following symptoms:  Weight gain of 3 pounds or more overnight or 5 pounds in a week, increased swelling in your hands or feet or shortness of breath while lying flat in bed. Please call your doctor as soon as you notice any of these symptoms; do not wait until your next office visit. Recognize signs and symptoms of STROKE:  F -  Face looks uneven  A -  Arms unable to move or move evenly  S -  Speech slurred or non-existent  T -  Time-call 911 as soon as signs and symptoms begin-DO NOT go          back to bed or wait to see if you get better-TIME IS BRAIN. Warning Signs of HEART ATTACK   Call 911 if you have these symptoms:    Chest discomfort. Most heart attacks involve discomfort in the center of the chest that lasts more than a few minutes, or that goes away and comes back. It can feel like uncomfortable pressure, squeezing, fullness, or pain. Discomfort in other areas of the upper body.  Symptoms can include pain or discomfort in one or both arms, the back, neck, jaw, or stomach. Shortness of breath with or without chest discomfort. Other signs may include breaking out in a cold sweat, nausea, or lightheadedness. Don't wait more than five minutes to call 911 - MINUTES MATTER! Fast action can save your life. Calling 911 is almost always the fastest way to get lifesaving treatment. Emergency Medical Services staff can begin treatment when they arrive -- up to an hour sooner than if someone gets to the hospital by car. Learning About Coronavirus [phone])  Coronavirus [phone]): Overview  What is coronavirus (COVID-19)? The coronavirus disease (COVID-19) is caused by a virus. It is an illness that was first found in [place], [place], in December 2019. It has since spread worldwide. The virus can cause fever, cough, and trouble breathing. In severe cases, it can cause pneumonia and make it hard to breathe without help. It can cause death. Coronaviruses are a large group of viruses. They cause the common cold. They also cause more serious illnesses like Middle East respiratory syndrome (MERS) and severe acute respiratory syndrome (SARS). COVID-19 is caused by a novel coronavirus. That means it's a new type that has not been seen in people before. This virus spreads person-to-person through droplets from coughing and sneezing. It can also spread when you are close to someone who is infected. And it can spread when you touch something that has the virus on it, such as a doorknob or a tabletop. What can you do to protect yourself from coronavirus (COVID-19)? The best way to protect yourself from getting sick is to: Avoid areas where there is an outbreak. Avoid contact with people who may be infected. Wash your hands often with soap or alcohol-based hand sanitizers. Avoid crowds and try to stay at least 6 feet away from other people. Wash your hands often, especially after you cough or sneeze. Use soap and water, and scrub for at least 20 seconds. If soap and water aren't available, use an alcohol-based hand . Avoid touching your mouth, nose, and eyes. What can you do to avoid spreading the virus to others? To help avoid spreading the virus to others:  Cover your mouth with a tissue when you cough or sneeze. Then throw the tissue in the trash. Use a disinfectant to clean things that you touch often. Stay home if you are sick or have been exposed to the virus. Don't go to school, work, or public areas. And don't use public transportation. If you are sick:  Leave your home only if you need to get medical care. But call the doctor's office first so they know you're coming. And wear a face mask, if you have one. If you have a face mask, wear it whenever you're around other people. It can help stop the spread of the virus when you cough or sneeze. Clean and disinfect your home every day. Use household  and disinfectant wipes or sprays. Take special care to clean things that you grab with your hands. These include doorknobs, remote controls, phones, and handles on your refrigerator and microwave. And don't forget countertops, tabletops, bathrooms, and computer keyboards. When to call for help  Call 911 anytime you think you may need emergency care. For example, call if:  You have severe trouble breathing. (You can't talk at all.)  You have constant chest pain or pressure. You are severely dizzy or lightheaded. You are confused or can't think clearly. Your face and lips have a blue color. You pass out (lose consciousness) or are very hard to wake up. Call your doctor now if you develop symptoms such as:  Shortness of breath. Fever. Cough. If you need to get care, call ahead to the doctor's office for instructions before you go. Make sure you wear a face mask, if you have one, to prevent exposing other people to the virus. Where can you get the latest information?   The following health organizations are tracking and studying this virus. Their websites contain the most up-to-date information. Kellee Ballard also learn what to do if you think you may have been exposed to the virus. U.[place]. Centers for Disease Control and Prevention (CDC): The CDC provides updated news about the disease and travel advice. The website also tells you how to prevent the spread of infection. www.cdc.gov  World Health Organization [facility]): WHO offers information about the virus outbreaks. WHO also has travel advice. www.who.int  Current as of: April 1, 2020               Content Version: 12.4  © 0286-3693 [employer]. Care instructions adapted under license by your healthcare professional. If you have questions about a medical condition or this instruction, always ask your healthcare professional. [address] any warranty or liability for your use of this information. The discharge information has been reviewed with the patient and caregiver. Any questions and concerns from the patient and caregiver have been addressed. The patient and caregiver verbalized understanding.         CONTENTS FOUND IN YOUR DISCHARGE ENVELOPE:  [x]     Surgeon and Hospital Discharge Instructions  [x]     [facility] Care Provider Card  []     Medication & Side Effect Guide            (your newly prescribed medications have been marked/highlighted showing the most common side effects from   the classes of drugs on your prescriptions)  []     Medication Prescription(s) x Estradiol, Hydrocodone, & Zofran ( [] These have been sent electronically to your pharmacy by your surgeon,   - OR -       your surgeon has already provided these to you during a previous/pre-op office visit)  []     [address]  []     Physical Therapy Prescription  []     Follow-up Appointment Cards  []     Surgery-related Pictures/Media  []     Pain block and/or block with On-Q Catheter from Anesthesia Service (information included in your instructions above)  []     Medical device information sheets/pamphlets from their    []     School/work excuse note. []     /parent work excuse note. The following personal items collected during your admission are returned to you:   Dental Appliance: Dental Appliances: None  Vision:    Hearing Aid:    Jewelry: Jewelry: None  Clothing: Clothing: Footwear, Pants, Jacket/Coat, Shirt, Undergarments  Other Valuables:  Other Valuables: None  Valuables sent to safe:

## 2022-12-19 NOTE — ANESTHESIA POSTPROCEDURE EVALUATION
Procedure(s):  LAPAROSCOPIC SUPRACERVICAL HYSTERECTOMY/BILATERAL SALPINGO-OOPHORECTOMY. general    Anesthesia Post Evaluation      Multimodal analgesia: multimodal analgesia not used between 6 hours prior to anesthesia start to PACU discharge  Patient location during evaluation: PACU  Patient participation: complete - patient participated  Level of consciousness: awake and alert  Pain score: 3  Pain management: satisfactory to patient  Airway patency: patent  Anesthetic complications: no  Cardiovascular status: hemodynamically stable and acceptable  Respiratory status: acceptable  Hydration status: acceptable  Comments: Patient seen and evaluated; no concerns. Post anesthesia nausea and vomiting:  none      INITIAL Post-op Vital signs:   Vitals Value Taken Time   /69 12/19/22 1115   Temp 36.4 °C (97.6 °F) 12/19/22 0945   Pulse 57 12/19/22 1118   Resp 17 12/19/22 1118   SpO2 100 % 12/19/22 1118   Vitals shown include unvalidated device data.

## 2022-12-19 NOTE — DISCHARGE SUMMARY
Gynecology Surgical Discharge Summary     Name: Andrew Mcdermott MRN: [id number]  SSN: [id number]    YOB: 1975  Age: 50 y.o. Sex: female      Admit date: 12/19/2022    Discharge Date: 12/19/2022      Attending Physician: Myles Sagastume MD     Admission Diagnoses: FIBROIDS, MENORRHAGIA, PELVIC PAIN, ABNORMAL UTERINE BLEEDING    Discharge Diagnoses: FIBROIDS, MENORRHAGIA, PELVIC PAIN, ABNORMAL UTERINE BLEEDING     Procedures: Procedure(s):  LAPAROSCOPIC SUPRACERVICAL HYSTERECTOMY/BILATERAL SALPINGO-OOPHORECTOMY    Hospital Course: Normal hospital course for this procedure. The patient was released to her home in good condition. Significant Diagnostic Studies:   Recent Results (from the past 24 hour(s))   HCG URINE, QL. - POC    Collection Time: 12/19/22  6:27 AM   Result Value Ref Range    Pregnancy test,urine (POC) Negative NEG     TYPE & SCREEN    Collection Time: 12/19/22  6:28 AM   Result Value Ref Range    Crossmatch Expiration 12/22/2022,2359     ABO/Rh(D) A POSITIVE     Antibody screen NEG        Patient Instructions:     Diet, activity, wound care: See printed instructions. I spent 10 minutes discharging the patient in face to face contact. Follow-up Appointments   Procedures    FOLLOW UP VISIT Appointment in: Two Weeks     Standing Status:   Standing     Number of Occurrences:   1     Order Specific Question:   Appointment in     Answer:    Two Weeks        Signed By:  Myles Sagastume MD     December 19, 2022

## 2022-12-19 NOTE — H&P
Gynecology History and Physical    Name: Matt Moeller MRN: [id number] SSN: [id number]    YOB: 1975  Age: 50 y.o. Sex: female       Subjective:      Chief complaint: Uterine fibroids    Kian Payne is a 50 y.o.  female with a history of uterine myomas. She has had fibroids for years and previous laparoscopy for endometriosis. OCP has controlled her bleeding until the last year. Now heavier and irregular. US shows multiple fibroids and endometrium is not visible because of them. 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 Previous evaluation has been done with US, which revealed 228 cc fibroid uterus. She has had an endometrial biopsy previously--negative. Previous treatment has consisted of OCP. She is now admitted for outpatient surgery: Procedure(s) (LRB):  LAPAROSCOPIC SUPRACERVICAL HYSTERECTOMY/BILATERAL SALPINGO-OOPHORECTOMY (N/A). The current method of family planning is oral contraceptive (estrogen/progesterone). OB History          2    Para   2    Term   2       0    AB   0    Living   2         SAB   0    IAB   0    Ectopic   0    Molar        Multiple   0    Live Births   2              Past Medical History:   Diagnosis Date    Abnormal Pap smear     BRCA negative     Endometriosis     Fibroids     History of seasonal allergies     HSV-2 (herpes simplex virus 2) infection     Pap smear for cervical cancer screening 8/17/10 neg HPV NEG 12 neg 4/24/15 neg HPV NEG    SVT (supraventricular tachycardia) ([facility] [address] 2019    no longer on medication for this.      Past Surgical History:   Procedure Laterality Date    HX DILATION AND CURETTAGE  2001    HX MYOMECTOMY      HX TONSIL AND ADENOIDECTOMY      HX WISDOM TEETH EXTRACTION Bilateral      Social History     Occupational History    Not on file   Tobacco Use    Smoking status: Never    Smokeless tobacco: Never   Vaping Use    Vaping Use: Never used   Substance and Sexual Activity    Alcohol use: Yes     Comment: 1-2 times a month    Drug use: No    Sexual activity: Not on file     Family History   Problem Relation Age of Onset    Breast Cancer Mother         No Known Allergies  Prior to Admission medications    Medication Sig Start Date End Date Taking? Authorizing Provider   vit C/zinc citrate/elderberry (SAMBUCUS ELDERBERRY PO) Take 1 Capsule by mouth daily. Yes Provider, Historical   mv-min/iron/folic/calcium/vitK (WOMEN'S MULTIVITAMIN PO) Take 2 Gum by mouth daily. Yes Provider, Historical   fluticasone propionate (FLONASE) 50 mcg/actuation nasal spray 2 Sprays by Both Nostrils route daily. Yes Provider, Historical   loratadine (CLARITIN) 10 mg tablet Take 10 mg by mouth daily. Yes Provider, Historical   norethindrone-e.estradioL-iron (Lo Loestrin Fe) 1 mg-10 mcg (24)/10 mcg (2) tab TAKE 1 TABLET BY MOUTH EVERY DAY, SKIP PLACEBOS TO ACHIEVE AMENORRHEA 4/26/22  Yes Emma Young MD   cyanocobalamin 1,000 mcg tablet Take 1,000 mcg by mouth daily. Provider, Historical   SUMAtriptan (IMITREX) 25 mg tablet Take 25 mg by mouth as needed for Migraine. Provider, Historical   naproxen sodium (NAPROSYN) 220 mg tablet Take 220 mg by mouth as needed. Provider, Historical   ibuprofen (MOTRIN) 200 mg tablet Take 400 mg by mouth as needed for Pain.     Provider, Historical        Review of Systems:  History obtained from the patient-negative for:  Constitutional: weight loss, fever, night sweats  HEENT: hearing loss, earache, congestion, snoring, sorethroat  CV: chest pain, palpitations, edema  Resp: cough, shortness of breath, wheezing  Breast: breast lumps, nipple discharge, galactorrhea  GI: change in bowel habits, abdominal pain, black or bloody stools  : frequency, dysuria, hematuria, vaginal discharge  MSK: back pain, joint pain, muscle pain  Skin: itching, rash, hives  Neuro: dizziness, headache, confusion, weakness  Psych: anxiety, depression, change in mood  Heme/lymph: bleeding, bruising, pallor        Objective:     Vitals:    12/19/22 0609 12/19/22 0624   BP:  125/66   Pulse:  84   Resp:  12   Temp:  98.3 °F (36.8 °C)   SpO2:  100%   Weight: 171 lb 1.2 oz (77.6 kg)    Height: 5' 4\" (1.626 m)        Physical Exam:  PHYSICAL EXAMINATION    Constitutional  Appearance: well-nourished, well developed, alert, in no acute distress    HENT  Head and Face: appears normal    Neck  Inspection/Palpation: normal appearance, no masses or tenderness  Lymph Nodes: no lymphadenopathy present  Thyroid: gland size normal, nontender, no nodules or masses present on palpation    Chest  Respiratory Effort: breathing unlabored  Auscultation: normal breath sounds    Cardiovascular  Heart:   Auscultation: regular rate and rhythm without murmur    Gastrointestinal  Abdominal Examination: abdomen non-tender to palpation, normal bowel sounds, no mass palpable  Liver and spleen: no hepatomegaly present, spleen not palpable  Hernias: no hernias identified    Genitourinary  External Genitalia: normal appearance for age, no discharge present, no tenderness present, no inflammatory lesions present, no masses present, no atrophy present  Vagina: normal vaginal vault without central or paravaginal defects, no discharge present, no inflammatory lesions present, no masses present  Bladder: non-tender to palpation  Urethra: appears normal  Cervix: normal   Uterus: enlarged to approximately 12 week size, irregular in shape  Adnexa: no adnexal tenderness present, no adnexal masses present  Perineum: perineum within normal limits, no evidence of trauma, no rashes or skin lesions present  Anus: anus within normal limits, no hemorrhoids present  Inguinal Lymph Nodes: no lymphadenopathy present    Skin  General Inspection: no rash, no lesions identified    Neurologic/Psychiatric  Mental Status:  Orientation: grossly oriented to person, place and time  Mood and Affect: mood normal, affect appropriate          Assessment:   Fibroids. Abnormal bleeding. Negative endometrial bx per Dr. Prateek Rockwell. Increased risk of ovarian cancer    Plan:   IC obtained for LSH/BSO, including morcellation. Pt states she has NFQ. Procedure(s) (LRB):  LAPAROSCOPIC SUPRACERVICAL HYSTERECTOMY/BILATERAL SALPINGO-OOPHORECTOMY (N/A)  Discussed the risks of surgery including the risks of bleeding, infection, deep vein thrombosis, and surgical injuries to internal organs including but not limited to the bowels, bladder, rectum, and female reproductive organs. The patient understands the risks; any and all questions were answered to the patient's satisfaction.     Signed By:  Lamonte Pereyra MD     December 19, 2022

## 2022-12-19 NOTE — OP NOTES
OPERATIVE REPORT [facility]/BSO    Name: Kirsten Peck Record Number: [id number]   YOB: 1975  Date of Surgery: 12/19/2022    Preoperative Diagnosis: FIBROIDS, MENORRHAGIA, PELVIC PAIN, ABNORMAL UTERINE BLEEDING    Postoperative Diagnosis: FIBROIDS, MENORRHAGIA, PELVIC PAIN, ABNORMAL UTERINE BLEEDING    Surgeon:  Myles Sagastume MD     Assistant:  staff    Anesthesia: General    Procedure: Procedure(s):  LAPAROSCOPIC SUPRACERVICAL HYSTERECTOMY/BILATERAL SALPINGO-OOPHORECTOMY     Findings: see below    Estimated Blood Loss:  Minimal cc    Drains: none    Pathology /Specimens:    ID Type Source Tests Collected by Time Destination   1 : Uterus, bilateral fallopian tubes, and ovaries  Preservative Uterus with Bilateral Fallopian tubes and Ovaries  Ros Braun MD 12/19/2022 1204 Pathology       Implants:  None    DVT Prophylaxis: SCD Hose    Antibiotic Prophylaxis: Ancef:    INDICATIONS:    Informed consent was obtained for the above procedure, and the patient stated that she had no further questions. PROCEDURE:  The patient was prepped and draped in the dorsal lithotomy position after institution of general anesthesia. An endometrial biopsy was performed prior to surgery and returned negative for malignancy. A Veress needle was placed through a 5 mm incision in the right upper quadrant near the midline. Pneumoperitoneum was obtained without difficulty. A balloon 5 mm port was placed there with direct visualization and without difficulty. The umbilical 12 mm port was placed with direct visualization through a vertical incision in the inferior edge of the umbilicus. Finally a 5 mm port was placed suprapubically in the midline. The pelvis was visualized. The uterus was enlarged to 12 week size with multiple fibroids. The adnexae were normal bilaterally. The cul-de-sac was normal.  Upper abdominal findings were unremarkable.     The Harmonic scalpel was used to take down the right infundibulopelvic ligament, the right round ligament and the right broad ligament. The incision was taken down anteriorly to the area of the bladder flap. The identical process was accomplished on the left side. The vessels were reached on both sides. Cauterization was accomplished and then the uterus blanched nicely. The cervix was identified by visualizing the insertion of the utero-sacral ligaments and incising a line just above them on the posterior cervix. Then starting on the right side and continuing across the junction, the uterus was divided from the cervix. The uterus was freed up without sign of injury of any other structures and then the endocervical canal was cauterized. The endocervix was wider than 1 cm so a purse string suture of 0 Monocryl was placed with good closure. The 12 mm port at the suprapubic site was used to introduce the nylon endobag into the abdomen. The uterus, tubes, and ovaries were placed in the bag. The opening of the bag was brought up through the incision at the umbilicus. The 12 mm port was introduced and the bag was inflated. The scope was placed into the upper 5 mm port and the harmonic scalpel used to make a puncture at the opening of the 5 mm trocar. It was introduced into the bag and the balloon inflated. The 12 port was removed, the morcellator introduced, and the uterus, tubes, and ovaries were morcellated in the bag and removed through the umbilicus. All tissue fragments were identified and removed and there was no spillage of contents into the abdominal cavity at any point. The bag was removed intact with some blood and small tissue fragments after deflating the 5 mm port balloon. The uterus, tubes, and ovaries were sent to pathology as the specimen. The abdomen was inspected and no trauma to any structures was identified. The pelvis was hemostatic. The interceed barrier was placed over the cervical stump. The procedure was then terminated.   The CO2 removed and then the instruments and trocars were removed from the abdomen. The incisions were closed with 0-Vicryl in the fascia in the umbilical incision and 4-0 Monocryl subcuticularly in all 3 incisions. The patient was awakened and taken to the recovery room in good condition.       Signed By:  Doris Evans MD     December 19, 2022

## 2022-12-19 NOTE — ANESTHESIA PREPROCEDURE EVALUATION
Relevant Problems   No relevant active problems       Anesthetic History   No history of anesthetic complications            Review of Systems / Medical History  Patient summary reviewed and nursing notes reviewed    Pulmonary  Within defined limits                 Neuro/Psych   Within defined limits           Cardiovascular            Dysrhythmias       Exercise tolerance: >4 METS     GI/Hepatic/Renal  Within defined limits              Endo/Other  Within defined limits           Other Findings              Physical Exam    Airway  Mallampati: II    Neck ROM: normal range of motion   Mouth opening: Normal     Cardiovascular    Rhythm: regular  Rate: normal         Dental  No notable dental hx       Pulmonary  Breath sounds clear to auscultation               Abdominal         Other Findings            Anesthetic Plan    ASA: 1  Anesthesia type: general          Induction: Intravenous  Anesthetic plan and risks discussed with: Patient      Informed consent obtained.

## 2022-12-29 NOTE — PROGRESS NOTES
Tiffany Louie is a 50 y.o. female presents for a problem visit. Chief Complaint   Patient presents with    Post OP Follow Up     Patient's last menstrual period was 09/19/2022 (exact date). Birth Control: none. Last Pap: normal obtained 2 year(s) ago. The patient is reporting having:  fatigue   since surgery  She reports the symptoms has improved. Aggravating factors include none. And alleviating factors include none. 223 gram Uterus, bilateral fallopian tubes, ovaries, supracervical   hysterectomy and bilateral salpingo-oophorectomy:        Benign inactive endometrium. Myometrium with leiomyomata and focal adenomyosis. Benign bilateral ovaries, involved by endometriosis. Benign bilateral fallopian tubes. 1. Have you been to the ER, urgent care clinic, or hospitalized since your last visit? No    2. Have you seen or consulted any other health care providers outside of the [address] [place] [address] since your last visit? No    Examination chaperoned by Ventura Raza LPN.         LAPAROSCOPIC SUPRACERVICAL HYSTERECTOMY/BILATERAL SALPINGO-OOPHORECTOMY

## 2023-01-03 ENCOUNTER — OFFICE VISIT (OUTPATIENT)
Dept: OBGYN CLINIC | Age: 48
End: 2023-01-03
Payer: COMMERCIAL

## 2023-01-03 VITALS — SYSTOLIC BLOOD PRESSURE: 104 MMHG | DIASTOLIC BLOOD PRESSURE: 70 MMHG | BODY MASS INDEX: 29.94 KG/M2 | WEIGHT: 174.4 LBS

## 2023-01-03 DIAGNOSIS — Z09 POSTOP CHECK: Primary | ICD-10-CM

## 2023-01-03 PROCEDURE — 99024 POSTOP FOLLOW-UP VISIT: CPT | Performed by: OBSTETRICS & GYNECOLOGY

## 2023-01-03 NOTE — PROGRESS NOTES
Cardiology Postop Laparoscopic Supracervical Hysterectomy Evaluation    Claudine Rosenbaum is a 50 y.o. female returns for a routine post-operative follow-up visit after undergoing the following: Laparoscopic Supracervical Hysterectomy which was done 2 weeks ago. Since the patient's surgery, she has had typical postoperative discomfort but no significant symptoms or problems since the surgery. The patient's incisions are healing well with no significant drainage. She states since the procedure, she has returned to full daily activities, ambulating, and not lifting or exercising. The patient is reporting having: fatigue  since surgery  She reports the symptoms has improved. Aggravating factors include none. And alleviating factors include none. Her pathology results revealed:  223 gram Uterus, bilateral fallopian tubes, ovaries, supracervical   hysterectomy and bilateral salpingo-oophorectomy:        Benign inactive endometrium. Myometrium with leiomyomata and focal adenomyosis. Benign bilateral ovaries, involved by endometriosis. Benign bilateral fallopian tubes. PHYSICAL EXAMINATION    Gastrointestinal  Abdominal Examination: abdomen non-tender to palpation, incisions healing well, normal bowel sounds, no masses present  Liver and spleen: no hepatomegaly present, spleen not palpable  Hernias: no hernias identified    Skin  General Inspection: no rash, no lesions identified    Neurologic/Psychiatric  Mental Status:  Orientation: grossly oriented to person, place and time  Mood and Affect: mood normal, affect appropriate    Assessment:  Normal postop LSH/BSO checkup    Plan:  RTO as scheduled for AE with Dr. Danielle Roman.

## 2023-04-25 NOTE — PROGRESS NOTES
Bryant Heller is a 50 y.o. female returns for an annual exam     No chief complaint on file. Patient's last menstrual period was 09/19/2022 (exact date). Her periods are absent. Problems:  pt reports one day of spotting last week  Birth Control: status post hysterectomy. Last Pap: normal obtained 3 year(s) ago. She does not have a history of SELENE 2, 3 or cervical cancer. Last Mammogram: had a recent mammogram 9/6/2022 which was negative for malignancy. Last colonoscopy: never obtained. 1. Have you been to the ER, urgent care clinic, or hospitalized since your last visit? No    2. Have you seen or consulted any other health care providers outside of the [address] [place] [address] since your last visit? yes    Examination chaperoned by Demi Chou CMA.

## 2023-04-26 ENCOUNTER — OFFICE VISIT (OUTPATIENT)
Dept: OBGYN CLINIC | Age: 48
End: 2023-04-26
Payer: COMMERCIAL

## 2023-04-26 VITALS — DIASTOLIC BLOOD PRESSURE: 79 MMHG | WEIGHT: 174 LBS | SYSTOLIC BLOOD PRESSURE: 117 MMHG | BODY MASS INDEX: 29.87 KG/M2

## 2023-04-26 DIAGNOSIS — Z01.419 ENCOUNTER FOR GYNECOLOGICAL EXAMINATION WITHOUT ABNORMAL FINDING: ICD-10-CM

## 2023-04-26 DIAGNOSIS — N89.8 VAGINAL DISCHARGE: Primary | ICD-10-CM

## 2023-04-26 PROCEDURE — 99396 PREV VISIT EST AGE 40-64: CPT | Performed by: OBSTETRICS & GYNECOLOGY

## 2023-04-26 RX ORDER — ESTRADIOL 0.1 MG/D
1 FILM, EXTENDED RELEASE TRANSDERMAL 2 TIMES WEEKLY
Qty: 25 PATCH | Refills: 4 | Status: SHIPPED | OUTPATIENT
Start: 2023-04-28

## 2023-04-26 NOTE — PROGRESS NOTES
Annual exam ages 21-44 post hysterectomy    Osbaldo Grossman is a ,  50 y.o. female   Patient's last menstrual period was 2022 (exact date). She presents for her annual checkup. She is having no significant problems. Had one day where she had one episode of bleeding and none since. Hormonal status:  She reports no perimenstrual type symptoms. She is not having vasomotor symptoms. The patient is using any ERT. Per Nursing Note:  Patient's last menstrual period was 2022 (exact date). Her periods are absent. Problems:  pt reports one day of spotting last week  Birth Control: status post hysterectomy. Last Pap: normal obtained 3 year(s) ago. She does not have a history of SELENE 2, 3 or cervical cancer. Last Mammogram: had a recent mammogram 2022 which was negative for malignancy. Last colonoscopy: never obtained. Past Medical History:   Diagnosis Date    Abnormal Pap smear     BRCA negative     Endometriosis     Fibroids     History of seasonal allergies     HSV-2 (herpes simplex virus 2) infection     Pap smear for cervical cancer screening 8/17/10 neg HPV NEG 12 neg 4/24/15 neg HPV NEG    SVT (supraventricular tachycardia) ([facility] [address] 2019    no longer on medication for this. Past Surgical History:   Procedure Laterality Date    HX DILATION AND CURETTAGE  2001    HX MYOMECTOMY      HX TONSIL AND ADENOIDECTOMY      HX WISDOM TEETH EXTRACTION Bilateral        Current Outpatient Medications   Medication Sig Dispense Refill    [START ON 2023] estradioL (Minivelle) 0.1 mg/24 hr 1 Patch by TransDERmal route two (2) times a week. 25 Patch 4    vit C/zinc citrate/elderberry (SAMBUCUS ELDERBERRY PO) Take 1 Capsule by mouth daily. mv-min/iron/folic/calcium/vitK (WOMEN'S MULTIVITAMIN PO) Take 2 Gum by mouth daily. fluticasone propionate (FLONASE) 50 mcg/actuation nasal spray 2 Sprays by Both Nostrils route daily.       loratadine (CLARITIN) 10 mg tablet Take 1 Tablet by mouth daily. cyanocobalamin 1,000 mcg tablet Take 1 Tablet by mouth daily. naproxen sodium (NAPROSYN) 220 mg tablet Take 1 Tablet by mouth as needed. ondansetron (ZOFRAN ODT) 8 mg disintegrating tablet Take 1 Tablet by mouth every eight (8) hours as needed for Nausea. (Patient not taking: Reported on 4/26/2023) 30 Tablet 2    SUMAtriptan (IMITREX) 25 mg tablet Take 25 mg by mouth as needed for Migraine. (Patient not taking: Reported on 4/26/2023)      ibuprofen (MOTRIN) 200 mg tablet Take 400 mg by mouth as needed for Pain. (Patient not taking: Reported on 4/26/2023)       Allergies: Patient has no known allergies. Tobacco History:  reports that she has never smoked. She has never used smokeless tobacco.  Alcohol Abuse:  reports current alcohol use. Drug Abuse:  reports no history of drug use.     Family Medical/Cancer History:   Family History   Problem Relation Age of Onset    Breast Cancer Mother         Review of Systems - History obtained from the patient  Constitutional: negative for weight loss, fever, night sweats  HEENT: negative for hearing loss, earache, congestion, snoring, sorethroat  CV: negative for chest pain, palpitations, edema  Resp: negative for cough, shortness of breath, wheezing  GI: negative for change in bowel habits, abdominal pain, black or bloody stools  : negative for frequency, dysuria, hematuria, vaginal discharge  MSK: negative for back pain, joint pain, muscle pain  Breast: negative for breast lumps, nipple discharge, galactorrhea  Skin :negative for itching, rash, hives  Neuro: negative for dizziness, headache, confusion, weakness  Psych: negative for anxiety, depression, change in mood  Heme/lymph: negative for bleeding, bruising, pallor    Physical Exam    Visit Vitals  /79   Wt 174 lb (78.9 kg)   LMP 09/19/2022 (Exact Date)   BMI 29.87 kg/m²       Constitutional  Appearance: well-nourished, well developed, alert, in no acute distress    HENT  Head and Face: appears normal    Neck  Inspection/Palpation: normal appearance, no masses or tenderness  Lymph Nodes: no lymphadenopathy present  Thyroid: gland size normal, nontender, no nodules or masses present on palpation    Chest  Respiratory Effort: breathing unlabored    Breasts  Inspection of Breasts: breasts symmetrical, no skin changes, no discharge present, nipple appearance normal, no skin retraction present  Palpation of Breasts and Axillae: no masses present on palpation, no breast tenderness  Axillary Lymph Nodes: no lymphadenopathy present    Gastrointestinal  Abdominal Examination: abdomen non-tender to palpation, normal bowel sounds, no masses present  Liver and spleen: no hepatomegaly present, spleen not palpable  Hernias: no hernias identified    Genitourinary  External Genitalia: normal appearance for age, no discharge present, no tenderness present, no inflammatory lesions present, no masses present, no atrophy present  Vagina: normal vaginal vault without central or paravaginal defects, no discharge present, no inflammatory lesions present, no masses present  Bladder: non-tender to palpation  Urethra: appears normal  Cervix: normal  Uterus: absent  Adnexa: no adnexal tenderness present, no adnexal masses present  Perineum: perineum within normal limits, no evidence of trauma, no rashes or skin lesions present  Anus: anus within normal limits, no hemorrhoids present  Inguinal Lymph Nodes: no lymphadenopathy present    Skin  General Inspection: no rash, no lesions identified    Neurologic/Psychiatric  Mental Status:  Orientation: grossly oriented to person, place and time  Mood and Affect: mood normal, affect appropriate    Assessment:  Routine gynecologic examination  Her current medical status is satisfactory with no evidence of significant gynecologic issues. Will switch patch to twice weekly minivelle as pt has irritation with patch if only more than 1/2 of week.   Vaginal discharge, will fu with nuab    Plan:  Counseled re: diet, exercise, healthy lifestyle  Return for yearly wellness visits  Rec screening mammogram

## 2023-04-29 LAB
A VAGINAE DNA VAG QL NAA+PROBE: NORMAL SCORE
BVAB2 DNA VAG QL NAA+PROBE: NORMAL SCORE
C ALBICANS DNA VAG QL NAA+PROBE: NEGATIVE
C GLABRATA DNA VAG QL NAA+PROBE: NEGATIVE
MEGA1 DNA VAG QL NAA+PROBE: NORMAL SCORE
SPECIMEN STATUS REPORT, ROLRST: NORMAL

## 2023-07-28 ENCOUNTER — TELEPHONE (OUTPATIENT)
Age: 48
End: 2023-07-28

## 2023-07-28 DIAGNOSIS — Z12.39 SCREENING BREAST EXAMINATION: Primary | ICD-10-CM

## 2023-07-28 NOTE — TELEPHONE ENCOUNTER
Two patient identifiers used    50year old patient last seen in the office on 4/26/2023 for ae    Patient does not want to wait till October to get mammogram in the office     Patient is requesting order for 2 d screening mammogram so she can go to [place] [facility]    Order placed as per MD verbal order for screening mammogram    Patient was provided with the central scheduling phone number so patient can schedule appointment

## 2023-09-07 ENCOUNTER — HOSPITAL ENCOUNTER (OUTPATIENT)
Facility: HOSPITAL | Age: 48
Discharge: HOME | End: 2023-09-07
Attending: OBSTETRICS & GYNECOLOGY
Payer: COMMERCIAL

## 2023-09-07 DIAGNOSIS — Z12.39 SCREENING BREAST EXAMINATION: ICD-10-CM

## 2023-09-07 PROCEDURE — 77063 BREAST TOMOSYNTHESIS BI: CPT

## 2024-04-29 ENCOUNTER — OFFICE VISIT (OUTPATIENT)
Age: 49
End: 2024-04-29
Payer: COMMERCIAL

## 2024-04-29 VITALS
DIASTOLIC BLOOD PRESSURE: 73 MMHG | WEIGHT: 175 LBS | BODY MASS INDEX: 31.01 KG/M2 | HEIGHT: 63 IN | SYSTOLIC BLOOD PRESSURE: 114 MMHG

## 2024-04-29 DIAGNOSIS — Z01.419 ENCOUNTER FOR WELL WOMAN EXAM WITH ROUTINE GYNECOLOGICAL EXAM: Primary | ICD-10-CM

## 2024-04-29 PROCEDURE — 99396 PREV VISIT EST AGE 40-64: CPT | Performed by: OBSTETRICS & GYNECOLOGY

## 2024-04-29 RX ORDER — ESTRADIOL 0.1 MG/D
PATCH, EXTENDED RELEASE TRANSDERMAL
COMMUNITY
Start: 2024-04-08 | End: 2024-04-29 | Stop reason: SDUPTHER

## 2024-04-29 RX ORDER — ESTRADIOL 0.1 MG/D
PATCH, EXTENDED RELEASE TRANSDERMAL
Qty: 10 PATCH | Refills: 11 | Status: SHIPPED | OUTPATIENT
Start: 2024-04-29

## 2024-04-29 RX ORDER — MONTELUKAST SODIUM 10 MG/1
10 TABLET ORAL DAILY
COMMUNITY
Start: 2024-04-02

## 2024-04-29 SDOH — ECONOMIC STABILITY: HOUSING INSECURITY
IN THE LAST 12 MONTHS, WAS THERE A TIME WHEN YOU DID NOT HAVE A STEADY PLACE TO SLEEP OR SLEPT IN A SHELTER (INCLUDING NOW)?: NO

## 2024-04-29 SDOH — ECONOMIC STABILITY: FOOD INSECURITY: WITHIN THE PAST 12 MONTHS, THE FOOD YOU BOUGHT JUST DIDN'T LAST AND YOU DIDN'T HAVE MONEY TO GET MORE.: NEVER TRUE

## 2024-04-29 SDOH — ECONOMIC STABILITY: FOOD INSECURITY: WITHIN THE PAST 12 MONTHS, YOU WORRIED THAT YOUR FOOD WOULD RUN OUT BEFORE YOU GOT MONEY TO BUY MORE.: NEVER TRUE

## 2024-04-29 SDOH — ECONOMIC STABILITY: INCOME INSECURITY: HOW HARD IS IT FOR YOU TO PAY FOR THE VERY BASICS LIKE FOOD, HOUSING, MEDICAL CARE, AND HEATING?: NOT HARD AT ALL

## 2024-04-29 ASSESSMENT — PATIENT HEALTH QUESTIONNAIRE - PHQ9
2. FEELING DOWN, DEPRESSED OR HOPELESS: NOT AT ALL
SUM OF ALL RESPONSES TO PHQ QUESTIONS 1-9: 0
SUM OF ALL RESPONSES TO PHQ9 QUESTIONS 1 & 2: 0
1. LITTLE INTEREST OR PLEASURE IN DOING THINGS: NOT AT ALL
SUM OF ALL RESPONSES TO PHQ QUESTIONS 1-9: 0

## 2024-04-29 NOTE — PROGRESS NOTES
Roxanna Ghosh is a 48 y.o. female returns for an annual exam     Chief Complaint   Patient presents with    Annual Exam       No LMP recorded. Patient has had a hysterectomy.  Her periods are absent.  Problems: no problems  Birth Control: status post hysterectomy.  Last Pap: normal obtained 4 year(s) ago.  She does not have a history of J CARLOS 2, 3 or cervical cancer.   Last Mammogram: had a recent mammogram 9/7/23 which was negative for malignancy.    Last colonoscopy: scheduled for this year per pt      1. Have you been to the ER, urgent care clinic, or hospitalized since your last visit? No    2. Have you seen or consulted any other health care providers outside of the [facility] since your last visit? No    Examination chaperoned by Esvin Saunders CMA.  
significant gynecologic issues.  Discussed r/b/a of hrt, pt desires to continue.    Plan:  Counseled re: diet, exercise, healthy lifestyle  Return for yearly wellness visits  Rec screening mammogram    Colonoscopy q10 years or cologuard q3 years after age 45.      Please note that this dictation was completed with [email or web address], the computer voice recognition software.  Quite often unanticipated grammatical, syntax, homophones, and other interpretive errors are inadvertently transcribed by the computer software.  Please disregard these errors.  Please excuse any errors that have escaped final proofreading.

## 2024-10-11 ENCOUNTER — HOSPITAL ENCOUNTER (OUTPATIENT)
Facility: HOSPITAL | Age: 49
Discharge: HOME | End: 2024-10-14
Payer: COMMERCIAL

## 2024-10-11 VITALS — BODY MASS INDEX: 31.01 KG/M2 | WEIGHT: 175 LBS | HEIGHT: 63 IN

## 2024-10-11 DIAGNOSIS — Z12.31 VISIT FOR SCREENING MAMMOGRAM: ICD-10-CM

## 2024-10-11 PROCEDURE — 77063 BREAST TOMOSYNTHESIS BI: CPT

## 2024-10-15 DIAGNOSIS — Z91.89 AT HIGH RISK FOR BREAST CANCER: Primary | ICD-10-CM

## 2025-05-02 RX ORDER — ESTRADIOL 0.1 MG/D
PATCH, EXTENDED RELEASE TRANSDERMAL
Qty: 8 PATCH | Refills: 0 | Status: SHIPPED | OUTPATIENT
Start: 2025-05-02

## 2025-05-02 NOTE — TELEPHONE ENCOUNTER
Two patient identifiers used      49 year old patient last seen in the office on 4/29/2024 and has next appointment on 5/7/2025.    Prescription refill sent as per verbal read back order from Dr. Nora Shields to get patient to her scheduled appointment.

## 2025-05-03 NOTE — PROGRESS NOTES
Roxanna Ghosh is a 49 y.o. female returns for an annual exam     Chief Complaint   Patient presents with    Annual Exam       No LMP recorded. Patient has had a hysterectomy.    Problems: no problems  Birth Control: status post hysterectomy.  Last Pap: normal obtained 10 year(s) ago.  She does not have a history of J CARLOS 2, 3 or cervical cancer.   Last Mammogram: had a recent mammogram 10/11/24 which was negative for malignancy.    Last colonoscopy: has not had one yet        Examination chaperoned by MERLIN GAUTAM RN.

## 2025-05-07 ENCOUNTER — OFFICE VISIT (OUTPATIENT)
Age: 50
End: 2025-05-07
Payer: COMMERCIAL

## 2025-05-07 VITALS
BODY MASS INDEX: 32.06 KG/M2 | HEART RATE: 78 BPM | RESPIRATION RATE: 18 BRPM | DIASTOLIC BLOOD PRESSURE: 78 MMHG | SYSTOLIC BLOOD PRESSURE: 125 MMHG | WEIGHT: 181 LBS

## 2025-05-07 DIAGNOSIS — Z91.89 INCREASED RISK OF BREAST CANCER: ICD-10-CM

## 2025-05-07 DIAGNOSIS — Z01.419 ENCOUNTER FOR WELL WOMAN EXAM WITH ROUTINE GYNECOLOGICAL EXAM: Primary | ICD-10-CM

## 2025-05-07 DIAGNOSIS — Z12.4 SCREENING FOR CERVICAL CANCER: ICD-10-CM

## 2025-05-07 PROCEDURE — 99396 PREV VISIT EST AGE 40-64: CPT | Performed by: OBSTETRICS & GYNECOLOGY

## 2025-05-07 PROCEDURE — 99459 PELVIC EXAMINATION: CPT | Performed by: OBSTETRICS & GYNECOLOGY

## 2025-05-07 RX ORDER — ESTRADIOL 0.1 MG/D
PATCH, EXTENDED RELEASE TRANSDERMAL
Qty: 24 PATCH | Refills: 4 | Status: SHIPPED | OUTPATIENT
Start: 2025-05-07

## 2025-05-07 RX ORDER — ESTRADIOL 0.1 MG/D
PATCH, EXTENDED RELEASE TRANSDERMAL
Qty: 8 PATCH | Refills: 0 | Status: SHIPPED | OUTPATIENT
Start: 2025-05-07 | End: 2025-05-07 | Stop reason: SDUPTHER

## 2025-05-07 SDOH — ECONOMIC STABILITY: FOOD INSECURITY: WITHIN THE PAST 12 MONTHS, THE FOOD YOU BOUGHT JUST DIDN'T LAST AND YOU DIDN'T HAVE MONEY TO GET MORE.: NEVER TRUE

## 2025-05-07 SDOH — ECONOMIC STABILITY: FOOD INSECURITY: WITHIN THE PAST 12 MONTHS, YOU WORRIED THAT YOUR FOOD WOULD RUN OUT BEFORE YOU GOT MONEY TO BUY MORE.: NEVER TRUE

## 2025-05-07 ASSESSMENT — PATIENT HEALTH QUESTIONNAIRE - PHQ9
SUM OF ALL RESPONSES TO PHQ QUESTIONS 1-9: 0
1. LITTLE INTEREST OR PLEASURE IN DOING THINGS: NOT AT ALL
SUM OF ALL RESPONSES TO PHQ QUESTIONS 1-9: 0
SUM OF ALL RESPONSES TO PHQ QUESTIONS 1-9: 0
2. FEELING DOWN, DEPRESSED OR HOPELESS: NOT AT ALL
SUM OF ALL RESPONSES TO PHQ QUESTIONS 1-9: 0

## 2025-05-07 NOTE — PROGRESS NOTES
Annual exam ages 18-39 post hysterectomy    Roxanna Ghosh is a ,  49 y.o. female   No LMP recorded. Patient has had a hysterectomy.    She presents for her annual checkup.     History of Present Illness  The patient presents for a well-woman exam.    She expresses satisfaction with her current estrogen patch therapy and wishes to continue it. She reports no new urinary issues. She has undergone genetic testing. She is due for her mammogram in 2025. She reports no presence of any abnormal lumps, bumps, sores, or unusual discharge. She has been experiencing increased appetite, which she attributes to her allergy medications. To manage this, she has initiated a workout regimen with her .    Supplemental Information  She had a flareup with her eye and was put on some high-powered steroid by the dermatologist. She has to put CeraVe on her eye and use antihistamine eyedrops.    FAMILY HISTORY  She has a strong family history of breast cancer.    Hormonal status:  She reports no perimenstrual type symptoms.   She is not having vasomotor symptoms.  The patient is using ERT. .     Sexual history:    She  reports being sexually active and has had partner(s) who are male. She reports using the following method of birth control/protection: Surgical.    Per Rooming Note:  Problems: no problems  Birth Control: status post hysterectomy.  Last Pap: normal obtained 10 year(s) ago.  She does not have a history of J CARLOS 2, 3 or cervical cancer.   Last Mammogram: had a recent mammogram 10/11/24 which was negative for malignancy.    Last colonoscopy: has not had one yet       Past Medical History:   Diagnosis Date    Abnormal Pap smear     BRCA negative     Endometriosis     Fibroids     History of seasonal allergies     HSV-2 (herpes simplex virus 2) infection     Pap smear for cervical cancer screening 8/17/10 neg HPV NEG 12 neg 4/24/15 neg HPV NEG    Screening mammogram for breast cancer 10/11/2024

## 2025-05-13 ENCOUNTER — RESULTS FOLLOW-UP (OUTPATIENT)
Age: 50
End: 2025-05-13

## 2025-05-13 ENCOUNTER — CLINICAL DOCUMENTATION (OUTPATIENT)
Age: 50
End: 2025-05-13

## 2025-05-13 LAB
CYTOLOGIST CVX/VAG CYTO: NORMAL
CYTOLOGY CVX/VAG DOC CYTO: NORMAL
CYTOLOGY CVX/VAG DOC THIN PREP: NORMAL
DX ICD CODE: NORMAL
HPV GENOTYPE REFLEX: NORMAL
HPV I/H RISK 4 DNA CVX QL PROBE+SIG AMP: NEGATIVE
OTHER STN SPEC: NORMAL
SERVICE CMNT-IMP: NORMAL
STAT OF ADQ CVX/VAG CYTO-IMP: NORMAL

## 2025-05-13 NOTE — PROGRESS NOTES
Rec'd a referral from Nora Mao for high risk appointment. Called and left message requesting a return call.

## 2025-05-15 RX ORDER — FLUCONAZOLE 150 MG/1
150 TABLET ORAL DAILY
Qty: 3 TABLET | Refills: 0 | Status: SHIPPED | OUTPATIENT
Start: 2025-05-15 | End: 2025-05-18

## 2025-05-15 NOTE — TELEPHONE ENCOUNTER
Two patient identifiers verified      49 year old patient last seen in the office on 5.7.2025.    Patient responded to the my chart that yes she is having symptoms of itching and irritation.    Patient calling today to say that the itching and irritation, and discharge are increasing and she would like to have the medication mentioned to treat yeast sent to her confirmed pharmacy.      Please send rx    Thank you

## 2025-05-16 NOTE — TELEPHONE ENCOUNTER
Nora Shields MD to Me      5/15/25  8:05 PM  Diflucan sent      This nurse attempted to reach the patient and left a message that MD sent her requested prescription on identifiable voice mail message.

## (undated) DEVICE — SUTURE SZ 0 27IN 5/8 CIR UR-6  TAPER PT VIOLET ABSRB VICRYL J603H

## (undated) DEVICE — BARRIER TISS ADH ABSRB 3X4IN -- GYNECARE INTERCEED

## (undated) DEVICE — 15MM BATTERY POWERED MORCELLATOR SYSTEM WITH OBTURATOR: Brand: LINA XCISE™, LAPAROSCOPIC MORCELLATOR

## (undated) DEVICE — GLOVE ORANGE PI 7   MSG9070

## (undated) DEVICE — PAD POSITIONING WNG STD KIT W/BODY STRP LF DISP

## (undated) DEVICE — BANDAGE ADH W0.75XL3IN NAT PLAS CURAD

## (undated) DEVICE — SOLUTION IRRIG 1000ML 0.9% SOD CHL USP POUR PLAS BTL

## (undated) DEVICE — NEEDLE INSUF L120MM ULT VERES ENDOPATH

## (undated) DEVICE — LAPAROSCOPIC TROCAR SLEEVE/SINGLE USE: Brand: KII® OPTICAL ACCESS SYSTEM

## (undated) DEVICE — MASTISOL ADHESIVE LIQ 2/3ML

## (undated) DEVICE — STRIP,CLOSURE,WOUND,MEDI-STRIP,1/2X4: Brand: MEDLINE

## (undated) DEVICE — GYN LAPAROSCOPY-SFMC: Brand: MEDLINE INDUSTRIES, INC.

## (undated) DEVICE — TUBING FLTR PLUME AWAY EVAC W/ SUCT DEV DISP PUREVIEW

## (undated) DEVICE — TROCAR: Brand: KII OPTICAL ACCESS SYSTEM

## (undated) DEVICE — Device

## (undated) DEVICE — GLOVE ORANGE PI 7 1/2   MSG9075

## (undated) DEVICE — TROCAR: Brand: KII® OPTICAL ACCESS SYSTEM

## (undated) DEVICE — SHEARS ENDOSCP HARM 36CM ULTRASONIC CRV TIP UPGRD